# Patient Record
Sex: FEMALE | Race: WHITE | NOT HISPANIC OR LATINO | URBAN - METROPOLITAN AREA
[De-identification: names, ages, dates, MRNs, and addresses within clinical notes are randomized per-mention and may not be internally consistent; named-entity substitution may affect disease eponyms.]

---

## 2018-08-01 ENCOUNTER — CONSULTATION (OUTPATIENT)
Dept: URBAN - METROPOLITAN AREA CLINIC 87 | Facility: CLINIC | Age: 63
End: 2018-08-01

## 2018-08-01 DIAGNOSIS — H33.311: ICD-10-CM

## 2018-08-01 DIAGNOSIS — H33.321: ICD-10-CM

## 2018-08-01 DIAGNOSIS — H35.61: ICD-10-CM

## 2018-08-01 PROCEDURE — 92225 OPHTHALMOSCOPY (INITIAL): CPT

## 2018-08-01 PROCEDURE — 1036F TOBACCO NON-USER: CPT

## 2018-08-01 PROCEDURE — G8427 DOCREV CUR MEDS BY ELIG CLIN: HCPCS

## 2018-08-01 PROCEDURE — 4040F PNEUMOC VAC/ADMIN/RCVD: CPT | Mod: 8P

## 2018-08-01 PROCEDURE — 67145 PROPH RTA DTCHMNT PC: CPT

## 2018-08-01 PROCEDURE — 99204 OFFICE O/P NEW MOD 45 MIN: CPT | Mod: 57

## 2018-08-01 ASSESSMENT — TONOMETRY
OD_IOP_MMHG: 19
OS_IOP_MMHG: 18

## 2018-08-01 ASSESSMENT — VISUAL ACUITY
OD_SC: 20/40+2
OS_SC: 20/50
OS_SC: 20/40-2
OS_PH: 20/25-2
OD_SC: 20/200

## 2018-08-13 ENCOUNTER — POST-OP CHECK (OUTPATIENT)
Dept: URBAN - METROPOLITAN AREA CLINIC 52 | Facility: CLINIC | Age: 63
End: 2018-08-13

## 2018-08-13 DIAGNOSIS — H33.321: ICD-10-CM

## 2018-08-13 DIAGNOSIS — H33.311: ICD-10-CM

## 2018-08-13 PROCEDURE — 92226 OPHTHALMOSCOPY (SUB): CPT

## 2018-08-13 PROCEDURE — 99024 POSTOP FOLLOW-UP VISIT: CPT

## 2018-08-13 ASSESSMENT — VISUAL ACUITY
OS_SC: 20/25-2
OD_SC: 20/30-2

## 2018-08-13 ASSESSMENT — TONOMETRY
OS_IOP_MMHG: 16
OD_IOP_MMHG: 20

## 2018-09-17 ENCOUNTER — POST-OP CHECK (OUTPATIENT)
Dept: URBAN - METROPOLITAN AREA CLINIC 52 | Facility: CLINIC | Age: 63
End: 2018-09-17

## 2018-09-17 DIAGNOSIS — H33.321: ICD-10-CM

## 2018-09-17 DIAGNOSIS — H33.311: ICD-10-CM

## 2018-09-17 PROCEDURE — 99024 POSTOP FOLLOW-UP VISIT: CPT

## 2018-09-17 PROCEDURE — 92226 OPHTHALMOSCOPY (SUB): CPT

## 2018-09-17 ASSESSMENT — VISUAL ACUITY
OS_SC: 20/30-1
OD_SC: 20/40

## 2018-09-17 ASSESSMENT — TONOMETRY
OD_IOP_MMHG: 20
OS_IOP_MMHG: 14

## 2021-11-29 ENCOUNTER — HOSPITAL ENCOUNTER (EMERGENCY)
Facility: HOSPITAL | Age: 66
Discharge: HOME | End: 2021-11-29
Attending: EMERGENCY MEDICINE | Admitting: EMERGENCY MEDICINE
Payer: COMMERCIAL

## 2021-11-29 ENCOUNTER — APPOINTMENT (EMERGENCY)
Dept: RADIOLOGY | Facility: HOSPITAL | Age: 66
End: 2021-11-29
Payer: COMMERCIAL

## 2021-11-29 VITALS
BODY MASS INDEX: 44.41 KG/M2 | HEART RATE: 68 BPM | WEIGHT: 293 LBS | DIASTOLIC BLOOD PRESSURE: 88 MMHG | RESPIRATION RATE: 20 BRPM | HEIGHT: 68 IN | SYSTOLIC BLOOD PRESSURE: 180 MMHG | TEMPERATURE: 98.4 F | OXYGEN SATURATION: 96 %

## 2021-11-29 DIAGNOSIS — R07.89 ATYPICAL CHEST PAIN: ICD-10-CM

## 2021-11-29 DIAGNOSIS — R06.02 SOB (SHORTNESS OF BREATH): Primary | ICD-10-CM

## 2021-11-29 DIAGNOSIS — M79.10 MYALGIA: ICD-10-CM

## 2021-11-29 DIAGNOSIS — R05.9 COUGH: ICD-10-CM

## 2021-11-29 LAB
ANION GAP SERPL CALCULATED.3IONS-SCNC: 5 MMOL/L (ref 4–13)
ANION GAP SERPL CALCULATED.3IONS-SCNC: 9 MMOL/L (ref 4–13)
BASOPHILS # BLD AUTO: 0.06 THOUSANDS/ΜL (ref 0–0.1)
BASOPHILS # BLD AUTO: 0.07 THOUSANDS/ΜL (ref 0–0.1)
BASOPHILS NFR BLD AUTO: 1 % (ref 0–1)
BASOPHILS NFR BLD AUTO: 1 % (ref 0–1)
BUN SERPL-MCNC: 15 MG/DL (ref 5–25)
BUN SERPL-MCNC: 16 MG/DL (ref 5–25)
CALCIUM SERPL-MCNC: 8.8 MG/DL (ref 8.3–10.1)
CALCIUM SERPL-MCNC: 9 MG/DL (ref 8.3–10.1)
CARDIAC TROPONIN I PNL SERPL HS: 2 NG/L
CARDIAC TROPONIN I PNL SERPL HS: 3 NG/L
CHLORIDE SERPL-SCNC: 105 MMOL/L (ref 100–108)
CHLORIDE SERPL-SCNC: 106 MMOL/L (ref 100–108)
CO2 SERPL-SCNC: 27 MMOL/L (ref 21–32)
CO2 SERPL-SCNC: 29 MMOL/L (ref 21–32)
CREAT SERPL-MCNC: 0.75 MG/DL (ref 0.6–1.3)
CREAT SERPL-MCNC: 0.81 MG/DL (ref 0.6–1.3)
EOSINOPHIL # BLD AUTO: 0.4 THOUSAND/ΜL (ref 0–0.61)
EOSINOPHIL # BLD AUTO: 0.43 THOUSAND/ΜL (ref 0–0.61)
EOSINOPHIL NFR BLD AUTO: 8 % (ref 0–6)
EOSINOPHIL NFR BLD AUTO: 8 % (ref 0–6)
ERYTHROCYTE [DISTWIDTH] IN BLOOD BY AUTOMATED COUNT: 11.5 % (ref 11.6–15.1)
ERYTHROCYTE [DISTWIDTH] IN BLOOD BY AUTOMATED COUNT: 11.7 % (ref 11.6–15.1)
FLUAV RNA RESP QL NAA+PROBE: NEGATIVE
FLUAV RNA RESP QL NAA+PROBE: NEGATIVE
FLUBV RNA RESP QL NAA+PROBE: NEGATIVE
FLUBV RNA RESP QL NAA+PROBE: NEGATIVE
GFR SERPL CREATININE-BSD FRML MDRD: 77 ML/MIN/1.73SQ M
GFR SERPL CREATININE-BSD FRML MDRD: 85 ML/MIN/1.73SQ M
GLUCOSE SERPL-MCNC: 124 MG/DL (ref 65–140)
GLUCOSE SERPL-MCNC: 135 MG/DL (ref 65–140)
HCT VFR BLD AUTO: 39.8 % (ref 34.8–46.1)
HCT VFR BLD AUTO: 40.9 % (ref 34.8–46.1)
HGB BLD-MCNC: 13 G/DL (ref 11.5–15.4)
HGB BLD-MCNC: 13.2 G/DL (ref 11.5–15.4)
IMM GRANULOCYTES # BLD AUTO: 0.01 THOUSAND/UL (ref 0–0.2)
IMM GRANULOCYTES # BLD AUTO: 0.03 THOUSAND/UL (ref 0–0.2)
IMM GRANULOCYTES NFR BLD AUTO: 0 % (ref 0–2)
IMM GRANULOCYTES NFR BLD AUTO: 1 % (ref 0–2)
LYMPHOCYTES # BLD AUTO: 2.16 THOUSANDS/ΜL (ref 0.6–4.47)
LYMPHOCYTES # BLD AUTO: 2.21 THOUSANDS/ΜL (ref 0.6–4.47)
LYMPHOCYTES NFR BLD AUTO: 42 % (ref 14–44)
LYMPHOCYTES NFR BLD AUTO: 43 % (ref 14–44)
MCH RBC QN AUTO: 31.3 PG (ref 26.8–34.3)
MCH RBC QN AUTO: 31.3 PG (ref 26.8–34.3)
MCHC RBC AUTO-ENTMCNC: 32.3 G/DL (ref 31.4–37.4)
MCHC RBC AUTO-ENTMCNC: 32.7 G/DL (ref 31.4–37.4)
MCV RBC AUTO: 96 FL (ref 82–98)
MCV RBC AUTO: 97 FL (ref 82–98)
MONOCYTES # BLD AUTO: 0.5 THOUSAND/ΜL (ref 0.17–1.22)
MONOCYTES # BLD AUTO: 0.56 THOUSAND/ΜL (ref 0.17–1.22)
MONOCYTES NFR BLD AUTO: 10 % (ref 4–12)
MONOCYTES NFR BLD AUTO: 11 % (ref 4–12)
NEUTROPHILS # BLD AUTO: 1.92 THOUSANDS/ΜL (ref 1.85–7.62)
NEUTROPHILS # BLD AUTO: 1.95 THOUSANDS/ΜL (ref 1.85–7.62)
NEUTS SEG NFR BLD AUTO: 37 % (ref 43–75)
NEUTS SEG NFR BLD AUTO: 38 % (ref 43–75)
NRBC BLD AUTO-RTO: 0 /100 WBCS
NRBC BLD AUTO-RTO: 0 /100 WBCS
NT-PROBNP SERPL-MCNC: 52 PG/ML
PLATELET # BLD AUTO: 237 THOUSANDS/UL (ref 149–390)
PLATELET # BLD AUTO: 249 THOUSANDS/UL (ref 149–390)
PMV BLD AUTO: 10.1 FL (ref 8.9–12.7)
PMV BLD AUTO: 9.1 FL (ref 8.9–12.7)
POTASSIUM SERPL-SCNC: 4.1 MMOL/L (ref 3.5–5.3)
POTASSIUM SERPL-SCNC: 4.3 MMOL/L (ref 3.5–5.3)
RBC # BLD AUTO: 4.15 MILLION/UL (ref 3.81–5.12)
RBC # BLD AUTO: 4.22 MILLION/UL (ref 3.81–5.12)
RSV RNA RESP QL NAA+PROBE: NEGATIVE
RSV RNA RESP QL NAA+PROBE: NEGATIVE
SARS-COV-2 RNA RESP QL NAA+PROBE: NEGATIVE
SARS-COV-2 RNA RESP QL NAA+PROBE: NEGATIVE
SODIUM SERPL-SCNC: 140 MMOL/L (ref 136–145)
SODIUM SERPL-SCNC: 141 MMOL/L (ref 136–145)
WBC # BLD AUTO: 5.09 THOUSAND/UL (ref 4.31–10.16)
WBC # BLD AUTO: 5.21 THOUSAND/UL (ref 4.31–10.16)

## 2021-11-29 PROCEDURE — 99285 EMERGENCY DEPT VISIT HI MDM: CPT

## 2021-11-29 PROCEDURE — 0241U HB NFCT DS VIR RESP RNA 4 TRGT: CPT | Performed by: EMERGENCY MEDICINE

## 2021-11-29 PROCEDURE — 84484 ASSAY OF TROPONIN QUANT: CPT | Performed by: EMERGENCY MEDICINE

## 2021-11-29 PROCEDURE — 85025 COMPLETE CBC W/AUTO DIFF WBC: CPT | Performed by: EMERGENCY MEDICINE

## 2021-11-29 PROCEDURE — 71045 X-RAY EXAM CHEST 1 VIEW: CPT

## 2021-11-29 PROCEDURE — 83880 ASSAY OF NATRIURETIC PEPTIDE: CPT | Performed by: EMERGENCY MEDICINE

## 2021-11-29 PROCEDURE — 93005 ELECTROCARDIOGRAM TRACING: CPT

## 2021-11-29 PROCEDURE — 99285 EMERGENCY DEPT VISIT HI MDM: CPT | Performed by: EMERGENCY MEDICINE

## 2021-11-29 PROCEDURE — 36415 COLL VENOUS BLD VENIPUNCTURE: CPT | Performed by: EMERGENCY MEDICINE

## 2021-11-29 PROCEDURE — 80048 BASIC METABOLIC PNL TOTAL CA: CPT | Performed by: EMERGENCY MEDICINE

## 2021-11-29 RX ORDER — GUAIFENESIN/DEXTROMETHORPHAN 100-10MG/5
10 SYRUP ORAL ONCE
Status: DISCONTINUED | OUTPATIENT
Start: 2021-11-29 | End: 2021-11-29

## 2021-11-29 RX ORDER — FUROSEMIDE 10 MG/ML
40 INJECTION INTRAMUSCULAR; INTRAVENOUS ONCE
Status: DISCONTINUED | OUTPATIENT
Start: 2021-11-29 | End: 2021-11-29 | Stop reason: HOSPADM

## 2021-11-29 RX ORDER — LISINOPRIL 10 MG/1
10 TABLET ORAL DAILY
COMMUNITY

## 2021-11-29 RX ORDER — GUAIFENESIN 100 MG/5ML
200 SYRUP ORAL 3 TIMES DAILY PRN
Qty: 120 ML | Refills: 0 | Status: SHIPPED | OUTPATIENT
Start: 2021-11-29 | End: 2021-12-09

## 2021-11-29 RX ORDER — ASPIRIN 81 MG/1
81 TABLET, CHEWABLE ORAL DAILY
COMMUNITY

## 2021-12-04 LAB
ATRIAL RATE: 71 BPM
P AXIS: 32 DEGREES
PR INTERVAL: 144 MS
QRS AXIS: 1 DEGREES
QRSD INTERVAL: 88 MS
QT INTERVAL: 380 MS
QTC INTERVAL: 412 MS
T WAVE AXIS: 27 DEGREES
VENTRICULAR RATE: 71 BPM

## 2021-12-04 PROCEDURE — 93010 ELECTROCARDIOGRAM REPORT: CPT | Performed by: INTERNAL MEDICINE

## 2023-09-03 ENCOUNTER — APPOINTMENT (EMERGENCY)
Dept: RADIOLOGY | Facility: HOSPITAL | Age: 68
End: 2023-09-03
Payer: COMMERCIAL

## 2023-09-03 ENCOUNTER — HOSPITAL ENCOUNTER (EMERGENCY)
Facility: HOSPITAL | Age: 68
Discharge: HOME | End: 2023-09-03
Attending: EMERGENCY MEDICINE | Admitting: EMERGENCY MEDICINE
Payer: COMMERCIAL

## 2023-09-03 VITALS
BODY MASS INDEX: 47.33 KG/M2 | HEART RATE: 96 BPM | TEMPERATURE: 99.6 F | OXYGEN SATURATION: 97 % | WEIGHT: 293 LBS | DIASTOLIC BLOOD PRESSURE: 96 MMHG | RESPIRATION RATE: 20 BRPM | SYSTOLIC BLOOD PRESSURE: 147 MMHG

## 2023-09-03 DIAGNOSIS — K59.00 CONSTIPATION: Primary | ICD-10-CM

## 2023-09-03 LAB
ALBUMIN SERPL BCP-MCNC: 4.4 G/DL (ref 3.5–5)
ALP SERPL-CCNC: 46 U/L (ref 34–104)
ALT SERPL W P-5'-P-CCNC: 13 U/L (ref 7–52)
ANION GAP SERPL CALCULATED.3IONS-SCNC: 7 MMOL/L
AST SERPL W P-5'-P-CCNC: 14 U/L (ref 13–39)
BASOPHILS # BLD AUTO: 0.05 THOUSANDS/ÂΜL (ref 0–0.1)
BASOPHILS NFR BLD AUTO: 1 % (ref 0–1)
BILIRUB SERPL-MCNC: 0.68 MG/DL (ref 0.2–1)
BUN SERPL-MCNC: 10 MG/DL (ref 5–25)
CALCIUM SERPL-MCNC: 9.6 MG/DL (ref 8.4–10.2)
CHLORIDE SERPL-SCNC: 105 MMOL/L (ref 96–108)
CO2 SERPL-SCNC: 26 MMOL/L (ref 21–32)
CREAT SERPL-MCNC: 0.71 MG/DL (ref 0.6–1.3)
EOSINOPHIL # BLD AUTO: 0.2 THOUSAND/ÂΜL (ref 0–0.61)
EOSINOPHIL NFR BLD AUTO: 3 % (ref 0–6)
ERYTHROCYTE [DISTWIDTH] IN BLOOD BY AUTOMATED COUNT: 12.1 % (ref 11.6–15.1)
GFR SERPL CREATININE-BSD FRML MDRD: 89 ML/MIN/1.73SQ M
GLUCOSE SERPL-MCNC: 116 MG/DL (ref 65–140)
HCT VFR BLD AUTO: 41.9 % (ref 34.8–46.1)
HGB BLD-MCNC: 14.5 G/DL (ref 11.5–15.4)
IMM GRANULOCYTES # BLD AUTO: 0.03 THOUSAND/UL (ref 0–0.2)
IMM GRANULOCYTES NFR BLD AUTO: 1 % (ref 0–2)
LYMPHOCYTES # BLD AUTO: 1.91 THOUSANDS/ÂΜL (ref 0.6–4.47)
LYMPHOCYTES NFR BLD AUTO: 31 % (ref 14–44)
MCH RBC QN AUTO: 32.4 PG (ref 26.8–34.3)
MCHC RBC AUTO-ENTMCNC: 34.6 G/DL (ref 31.4–37.4)
MCV RBC AUTO: 94 FL (ref 82–98)
MONOCYTES # BLD AUTO: 0.62 THOUSAND/ÂΜL (ref 0.17–1.22)
MONOCYTES NFR BLD AUTO: 10 % (ref 4–12)
NEUTROPHILS # BLD AUTO: 3.28 THOUSANDS/ÂΜL (ref 1.85–7.62)
NEUTS SEG NFR BLD AUTO: 54 % (ref 43–75)
NRBC BLD AUTO-RTO: 0 /100 WBCS
PLATELET # BLD AUTO: 261 THOUSANDS/UL (ref 149–390)
PMV BLD AUTO: 9 FL (ref 8.9–12.7)
POTASSIUM SERPL-SCNC: 3.7 MMOL/L (ref 3.5–5.3)
PROT SERPL-MCNC: 7.2 G/DL (ref 6.4–8.4)
RBC # BLD AUTO: 4.47 MILLION/UL (ref 3.81–5.12)
SODIUM SERPL-SCNC: 138 MMOL/L (ref 135–147)
WBC # BLD AUTO: 6.09 THOUSAND/UL (ref 4.31–10.16)

## 2023-09-03 PROCEDURE — 80053 COMPREHEN METABOLIC PANEL: CPT | Performed by: EMERGENCY MEDICINE

## 2023-09-03 PROCEDURE — 96361 HYDRATE IV INFUSION ADD-ON: CPT

## 2023-09-03 PROCEDURE — 99284 EMERGENCY DEPT VISIT MOD MDM: CPT | Performed by: EMERGENCY MEDICINE

## 2023-09-03 PROCEDURE — 36415 COLL VENOUS BLD VENIPUNCTURE: CPT | Performed by: EMERGENCY MEDICINE

## 2023-09-03 PROCEDURE — 99283 EMERGENCY DEPT VISIT LOW MDM: CPT

## 2023-09-03 PROCEDURE — 74022 RADEX COMPL AQT ABD SERIES: CPT

## 2023-09-03 PROCEDURE — 85025 COMPLETE CBC W/AUTO DIFF WBC: CPT | Performed by: EMERGENCY MEDICINE

## 2023-09-03 PROCEDURE — 96360 HYDRATION IV INFUSION INIT: CPT

## 2023-09-03 RX ORDER — ALBUTEROL SULFATE 90 UG/1
2 AEROSOL, METERED RESPIRATORY (INHALATION) EVERY 6 HOURS PRN
COMMUNITY

## 2023-09-03 RX ORDER — DOCUSATE SODIUM 100 MG/1
100 CAPSULE, LIQUID FILLED ORAL EVERY 12 HOURS
Qty: 60 CAPSULE | Refills: 0 | Status: SHIPPED | OUTPATIENT
Start: 2023-09-03

## 2023-09-03 RX ORDER — POLYETHYLENE GLYCOL 3350 17 G/17G
17 POWDER, FOR SOLUTION ORAL DAILY
Qty: 119 G | Refills: 0 | Status: SHIPPED | OUTPATIENT
Start: 2023-09-03 | End: 2023-09-10

## 2023-09-03 RX ADMIN — SODIUM CHLORIDE 1000 ML: 0.9 INJECTION, SOLUTION INTRAVENOUS at 15:44

## 2023-09-03 NOTE — ED NOTES
This RN administer soap enema to pt. Pt instructed to use call bell when she is ready to use bedside commode.       Candace Lobo RN  09/03/23 0307

## 2023-09-03 NOTE — ED PROVIDER NOTES
History  Chief Complaint   Patient presents with   • Constipation     C/o constipation. No bm for three days. Took dulcolax suppository which came out. States cheese constipates her and this is what she has been eating for past several days     62-year-old female with past history of morbid obesity, constipation every time she eats cheese, arthritis, hypertension, mitral valve prolapse, presents to the ED for evaluation of constipation with no stool output over the past 3 days. Patient states that she happened to have only cheesy products to eat over the past week. Patient usually eats prunes or spinach when she eats cheese however she forgot. Patient also drank very little water. As a result patient became constipated. Patient has not had any bowel movement over the past 3 days and feels very bloated. Patient tried to strain to have a bowel movement last night with no relief. Patient called PCP today and was told to take Dulcolax however no bowel movement after today and Dulcolax. Subsequently patient came to the ED for further evaluation. History provided by:  Patient  Constipation  Associated symptoms: no abdominal pain, no back pain, no dysuria, no fever and no vomiting        Prior to Admission Medications   Prescriptions Last Dose Informant Patient Reported? Taking? albuterol (PROVENTIL HFA,VENTOLIN HFA) 90 mcg/act inhaler   Yes Yes   Sig: Inhale 2 puffs every 6 (six) hours as needed for wheezing   aspirin 81 mg chewable tablet   Yes No   Sig: Chew 81 mg daily   lisinopril (ZESTRIL) 10 mg tablet   Yes No   Sig: Take 10 mg by mouth daily   metoprolol tartrate (LOPRESSOR) 25 mg tablet   Yes No   Sig: Take 25 mg by mouth every 12 (twelve) hours      Facility-Administered Medications: None       Past Medical History:   Diagnosis Date   • Arthritis    • Hypertension    • Mitral prolapse        Past Surgical History:   Procedure Laterality Date   • HERNIA REPAIR         History reviewed.  No pertinent family history. I have reviewed and agree with the history as documented. E-Cigarette/Vaping   • E-Cigarette Use Never User      E-Cigarette/Vaping Substances     Social History     Tobacco Use   • Smoking status: Never   • Smokeless tobacco: Never   Vaping Use   • Vaping Use: Never used   Substance Use Topics   • Alcohol use: Never   • Drug use: Never       Review of Systems   Constitutional: Negative for chills and fever. HENT: Negative for ear pain and sore throat. Eyes: Negative for pain and visual disturbance. Respiratory: Negative for cough and shortness of breath. Cardiovascular: Negative for chest pain and palpitations. Gastrointestinal: Positive for constipation. Negative for abdominal pain and vomiting. Genitourinary: Negative for dysuria and hematuria. Musculoskeletal: Negative for arthralgias and back pain. Skin: Negative for color change and rash. Neurological: Negative for seizures and syncope. All other systems reviewed and are negative. Physical Exam  Physical Exam  Vitals and nursing note reviewed. Constitutional:       General: She is not in acute distress. Appearance: She is well-developed. Comments: Morbidly obese female   HENT:      Head: Normocephalic and atraumatic. Eyes:      Conjunctiva/sclera: Conjunctivae normal.   Cardiovascular:      Rate and Rhythm: Normal rate and regular rhythm. Heart sounds: No murmur heard. Pulmonary:      Effort: Pulmonary effort is normal. No respiratory distress. Breath sounds: Normal breath sounds. Abdominal:      General: Bowel sounds are normal. There is distension. Palpations: Abdomen is soft. Tenderness: There is abdominal tenderness. Comments: Abdomen is soft, mildly distended, with some pressure felt to the suprapubic area. Musculoskeletal:         General: No swelling. Cervical back: Neck supple. Skin:     General: Skin is warm and dry.       Capillary Refill: Capillary refill takes less than 2 seconds. Neurological:      Mental Status: She is alert.    Psychiatric:         Mood and Affect: Mood normal.         Vital Signs  ED Triage Vitals [09/03/23 1421]   Temperature Pulse Respirations Blood Pressure SpO2   99.6 °F (37.6 °C) 84 22 (!) 172/94 97 %      Temp Source Heart Rate Source Patient Position - Orthostatic VS BP Location FiO2 (%)   Tympanic Monitor Sitting Right arm --      Pain Score       10 - Worst Possible Pain           Vitals:    09/03/23 1421 09/03/23 1932   BP: (!) 172/94 147/96   Pulse: 84 96   Patient Position - Orthostatic VS: Sitting Sitting         Visual Acuity      ED Medications  Medications   sodium chloride 0.9 % bolus 1,000 mL (0 mL Intravenous Stopped 9/3/23 1930)       Diagnostic Studies  Results Reviewed     Procedure Component Value Units Date/Time    Comprehensive metabolic panel [[id number]] Collected: 09/03/23 1543    Lab Status: Final result Specimen: Blood from Arm, Right Updated: 09/03/23 1607     Sodium 138 mmol/L      Potassium 3.7 mmol/L      Chloride 105 mmol/L      CO2 26 mmol/L      ANION GAP 7 mmol/L      BUN 10 mg/dL      Creatinine 0.71 mg/dL      Glucose 116 mg/dL      Calcium 9.6 mg/dL      AST 14 U/L      ALT 13 U/L      Alkaline Phosphatase 46 U/L      Total Protein 7.2 g/dL      Albumin 4.4 g/dL      Total Bilirubin 0.68 mg/dL      eGFR 89 ml/min/1.73sq m     Narrative:      [facility] guidelines for Chronic Kidney Disease (CKD):   •  Stage 1 with normal or high GFR (GFR > 90 mL/min/1.73 square meters)  •  Stage 2 Mild CKD (GFR = 60-89 mL/min/1.73 square meters)  •  Stage 3A Moderate CKD (GFR = 45-59 mL/min/1.73 square meters)  •  Stage 3B Moderate CKD (GFR = 30-44 mL/min/1.73 square meters)  •  Stage 4 Severe CKD (GFR = 15-29 mL/min/1.73 square meters)  •  Stage 5 End Stage CKD (GFR <15 mL/min/1.73 square meters)  Note: GFR calculation is accurate only with a steady state creatinine    CBC and differential [[id number]] Collected: 09/03/23 1543    Lab Status: Final result Specimen: Blood from Arm, Right Updated: 09/03/23 1550     WBC 6.09 Thousand/uL      RBC 4.47 Million/uL      Hemoglobin 14.5 g/dL      Hematocrit 41.9 %      MCV 94 fL      MCH 32.4 pg      MCHC 34.6 g/dL      RDW 12.1 %      MPV 9.0 fL      Platelets 326 Thousands/uL      nRBC 0 /100 WBCs      Neutrophils Relative 54 %      Immat GRANS % 1 %      Lymphocytes Relative 31 %      Monocytes Relative 10 %      Eosinophils Relative 3 %      Basophils Relative 1 %      Neutrophils Absolute 3.28 Thousands/µL      Immature Grans Absolute 0.03 Thousand/uL      Lymphocytes Absolute 1.91 Thousands/µL      Monocytes Absolute 0.62 Thousand/µL      Eosinophils Absolute 0.20 Thousand/µL      Basophils Absolute 0.05 Thousands/µL                  XR abdomen obstruction series    (Results Pending)              Procedures  Procedures         ED Course  ED Course as of 09/03/23 2313   Sun Sep 03, 2023   1718 Bladder scan 18 cc of urine. 2019 Patient reexamined at bedside. Patient had a bowel movement and she feels much better. Patient is requesting to go home. SBIRT 22yo+    Flowsheet Row Most Recent Value   Initial Alcohol Screen: US AUDIT-C     1. How often do you have a drink containing alcohol? 0 Filed at: 09/03/2023 1935   2. How many drinks containing alcohol do you have on a typical day you are drinking? 0 Filed at: 09/03/2023 1935   3b. FEMALE Any Age, or MALE 65+: How often do you have 4 or more drinks on one occassion? 0 Filed at: 09/03/2023 1935   Audit-C Score 0 Filed at: 09/03/2023 1935   DEANDRE: How many times in the past year have you. .. Used an illegal drug or used a prescription medication for non-medical reasons?  Never Filed at: 09/03/2023 1935                    Medical Decision Making  Obtain blood work, UA, obstruction series  Give enema and continue to monitor patient for any worsening symptoms    Patient's abdominal x-ray showed moderate amount of stool in the rectal vault based on my impression. Formal radiology reading is pending. Patient had a large bowel movement after soapsuds enema were given. At this time I discussed high-fiber diet. Patient is discharged home on Colace as well as MiraLAX and follow-up to PCP. Close return instructions given to return to the ER for any worsening symptoms. Patient agrees with discharge plan. Patient well appearing at time of discharge. Please Note: Fluency Direct voice recognition software may have been used in the creation of this document. Wrong words or sound a like substitutions may have occurred due to the inherent limitations of the voice software. Amount and/or Complexity of Data Reviewed  Labs: ordered. Radiology: ordered and independent interpretation performed. Decision-making details documented in ED Course. Risk  OTC drugs. Disposition  Final diagnoses:   Constipation     Time reflects when diagnosis was documented in both MDM as applicable and the Disposition within this note     Time User Action Codes Description Comment    9/3/2023  8:27 PM Daina Hernandez Add [K59.00] Constipation       ED Disposition     ED Disposition   Discharge    Condition   Stable    Date/Time   Sun Sep 3, 2023  8:27 PM    Comment   Hoang Jacobson discharge to home/self care.                Follow-up Information     Follow up With Specialties Details Why [address], MD Internal Medicine In 1 week  [address] [place] [address]  [phone]            Discharge Medication List as of 9/3/2023  8:28 PM      START taking these medications    Details   docusate sodium (COLACE) 100 mg capsule Take 1 capsule (100 mg total) by mouth every 12 (twelve) hours, Starting Sun 9/3/2023, Normal      polyethylene glycol (MIRALAX) 17 g packet Take 17 g by mouth daily for 7 days, Starting Sun 9/3/2023, Until Sun 9/10/2023, Normal CONTINUE these medications which have NOT CHANGED    Details   albuterol (PROVENTIL HFA,VENTOLIN HFA) 90 mcg/act inhaler Inhale 2 puffs every 6 (six) hours as needed for wheezing, Historical Med      aspirin 81 mg chewable tablet Chew 81 mg daily, Historical Med      lisinopril (ZESTRIL) 10 mg tablet Take 10 mg by mouth daily, Historical Med      metoprolol tartrate (LOPRESSOR) 25 mg tablet Take 25 mg by mouth every 12 (twelve) hours, Historical Med             No discharge procedures on file.     PDMP Review     None          ED Provider  Electronically Signed by           Jefry Carreno DO  09/03/23 0918

## 2023-09-04 NOTE — ED NOTES
Patient able to move bowels- large brown soft and watery stool.      Butch Hernández RN  09/03/23 2018

## 2025-04-07 ENCOUNTER — HOSPITAL ENCOUNTER (OUTPATIENT)
Dept: RADIOLOGY | Facility: HOSPITAL | Age: 70
Discharge: HOME | End: 2025-04-07
Payer: MEDICARE

## 2025-04-07 DIAGNOSIS — R06.02 SHORTNESS OF BREATH: ICD-10-CM

## 2025-04-07 PROCEDURE — 71046 X-RAY EXAM CHEST 2 VIEWS: CPT

## 2025-05-29 ENCOUNTER — HOSPITAL ENCOUNTER (EMERGENCY)
Facility: HOSPITAL | Age: 70
Discharge: HOME | End: 2025-05-29
Payer: MEDICARE

## 2025-05-29 VITALS
HEART RATE: 75 BPM | TEMPERATURE: 98.6 F | RESPIRATION RATE: 17 BRPM | SYSTOLIC BLOOD PRESSURE: 177 MMHG | OXYGEN SATURATION: 97 % | DIASTOLIC BLOOD PRESSURE: 84 MMHG

## 2025-05-29 DIAGNOSIS — N39.0 UTI (URINARY TRACT INFECTION): ICD-10-CM

## 2025-05-29 DIAGNOSIS — F33.1 MODERATE EPISODE OF RECURRENT MAJOR DEPRESSIVE DISORDER (HCC): ICD-10-CM

## 2025-05-29 DIAGNOSIS — F41.9 ANXIETY: Primary | ICD-10-CM

## 2025-05-29 DIAGNOSIS — F32.A DEPRESSION: ICD-10-CM

## 2025-05-29 LAB
ALBUMIN SERPL BCG-MCNC: 4.1 G/DL (ref 3.5–5)
ALP SERPL-CCNC: 41 U/L (ref 34–104)
ALT SERPL W P-5'-P-CCNC: 16 U/L (ref 7–52)
ANION GAP SERPL CALCULATED.3IONS-SCNC: 9 MMOL/L (ref 4–13)
AST SERPL W P-5'-P-CCNC: 15 U/L (ref 13–39)
BACTERIA UR QL AUTO: ABNORMAL /HPF
BASOPHILS # BLD AUTO: 0.06 THOUSANDS/ÂΜL (ref 0–0.1)
BASOPHILS NFR BLD AUTO: 1 % (ref 0–1)
BILIRUB SERPL-MCNC: 0.72 MG/DL (ref 0.2–1)
BILIRUB UR QL STRIP: NEGATIVE
BUN SERPL-MCNC: 6 MG/DL (ref 5–25)
CALCIUM SERPL-MCNC: 9.4 MG/DL (ref 8.4–10.2)
CARDIAC TROPONIN I PNL SERPL HS: 4 NG/L (ref ?–50)
CHLORIDE SERPL-SCNC: 102 MMOL/L (ref 96–108)
CLARITY UR: CLEAR
CO2 SERPL-SCNC: 28 MMOL/L (ref 21–32)
COLOR UR: YELLOW
CREAT SERPL-MCNC: 0.71 MG/DL (ref 0.6–1.3)
EOSINOPHIL # BLD AUTO: 0.07 THOUSAND/ÂΜL (ref 0–0.61)
EOSINOPHIL NFR BLD AUTO: 1 % (ref 0–6)
ERYTHROCYTE [DISTWIDTH] IN BLOOD BY AUTOMATED COUNT: 12.2 % (ref 11.6–15.1)
GFR SERPL CREATININE-BSD FRML MDRD: 87 ML/MIN/1.73SQ M
GLUCOSE SERPL-MCNC: 110 MG/DL (ref 65–140)
GLUCOSE UR STRIP-MCNC: NEGATIVE MG/DL
HCT VFR BLD AUTO: 42 % (ref 34.8–46.1)
HGB BLD-MCNC: 14.2 G/DL (ref 11.5–15.4)
HGB UR QL STRIP.AUTO: NEGATIVE
IMM GRANULOCYTES # BLD AUTO: 0.02 THOUSAND/UL (ref 0–0.2)
IMM GRANULOCYTES NFR BLD AUTO: 0 % (ref 0–2)
KETONES UR STRIP-MCNC: ABNORMAL MG/DL
LEUKOCYTE ESTERASE UR QL STRIP: ABNORMAL
LYMPHOCYTES # BLD AUTO: 1.77 THOUSANDS/ÂΜL (ref 0.6–4.47)
LYMPHOCYTES NFR BLD AUTO: 35 % (ref 14–44)
MCH RBC QN AUTO: 31.8 PG (ref 26.8–34.3)
MCHC RBC AUTO-ENTMCNC: 33.8 G/DL (ref 31.4–37.4)
MCV RBC AUTO: 94 FL (ref 82–98)
MONOCYTES # BLD AUTO: 0.61 THOUSAND/ÂΜL (ref 0.17–1.22)
MONOCYTES NFR BLD AUTO: 12 % (ref 4–12)
MUCOUS THREADS UR QL AUTO: ABNORMAL
NEUTROPHILS # BLD AUTO: 2.52 THOUSANDS/ÂΜL (ref 1.85–7.62)
NEUTS SEG NFR BLD AUTO: 51 % (ref 43–75)
NITRITE UR QL STRIP: NEGATIVE
NON-SQ EPI CELLS URNS QL MICRO: ABNORMAL /HPF
NRBC BLD AUTO-RTO: 0 /100 WBCS
PH UR STRIP.AUTO: 6 [PH]
PLATELET # BLD AUTO: 247 THOUSANDS/UL (ref 149–390)
PMV BLD AUTO: 9.1 FL (ref 8.9–12.7)
POTASSIUM SERPL-SCNC: 3.2 MMOL/L (ref 3.5–5.3)
PROT SERPL-MCNC: 6.6 G/DL (ref 6.4–8.4)
PROT UR STRIP-MCNC: NEGATIVE MG/DL
RBC # BLD AUTO: 4.46 MILLION/UL (ref 3.81–5.12)
RBC #/AREA URNS AUTO: ABNORMAL /HPF
SODIUM SERPL-SCNC: 139 MMOL/L (ref 135–147)
SP GR UR STRIP.AUTO: 1.01 (ref 1–1.03)
TSH SERPL DL<=0.05 MIU/L-ACNC: 1.03 UIU/ML (ref 0.45–4.5)
UROBILINOGEN UR STRIP-ACNC: <2 MG/DL
WBC # BLD AUTO: 5.05 THOUSAND/UL (ref 4.31–10.16)
WBC #/AREA URNS AUTO: ABNORMAL /HPF

## 2025-05-29 PROCEDURE — 93005 ELECTROCARDIOGRAM TRACING: CPT

## 2025-05-29 PROCEDURE — 36415 COLL VENOUS BLD VENIPUNCTURE: CPT

## 2025-05-29 PROCEDURE — 84484 ASSAY OF TROPONIN QUANT: CPT

## 2025-05-29 PROCEDURE — 80053 COMPREHEN METABOLIC PANEL: CPT

## 2025-05-29 PROCEDURE — 99284 EMERGENCY DEPT VISIT MOD MDM: CPT

## 2025-05-29 PROCEDURE — 84443 ASSAY THYROID STIM HORMONE: CPT

## 2025-05-29 PROCEDURE — 85025 COMPLETE CBC W/AUTO DIFF WBC: CPT

## 2025-05-29 PROCEDURE — 81001 URINALYSIS AUTO W/SCOPE: CPT

## 2025-05-29 PROCEDURE — 87086 URINE CULTURE/COLONY COUNT: CPT

## 2025-05-29 RX ORDER — HYDROXYZINE HYDROCHLORIDE 25 MG/1
25 TABLET, FILM COATED ORAL EVERY 6 HOURS
Qty: 12 TABLET | Refills: 0 | Status: SHIPPED | OUTPATIENT
Start: 2025-05-29

## 2025-05-29 RX ORDER — CEPHALEXIN 500 MG/1
500 CAPSULE ORAL ONCE
Status: COMPLETED | OUTPATIENT
Start: 2025-05-29 | End: 2025-05-29

## 2025-05-29 RX ORDER — POTASSIUM CHLORIDE 1500 MG/1
40 TABLET, EXTENDED RELEASE ORAL ONCE
Status: COMPLETED | OUTPATIENT
Start: 2025-05-29 | End: 2025-05-29

## 2025-05-29 RX ORDER — CEPHALEXIN 500 MG/1
500 CAPSULE ORAL EVERY 12 HOURS SCHEDULED
Qty: 10 CAPSULE | Refills: 0 | Status: SHIPPED | OUTPATIENT
Start: 2025-05-29 | End: 2025-06-03

## 2025-05-29 RX ORDER — CEPHALEXIN 500 MG/1
500 CAPSULE ORAL ONCE
Status: DISCONTINUED | OUTPATIENT
Start: 2025-05-29 | End: 2025-05-29

## 2025-05-29 RX ORDER — CIPROFLOXACIN 250 MG/1
250 TABLET, FILM COATED ORAL EVERY 12 HOURS
Qty: 6 TABLET | Refills: 0 | Status: SHIPPED | OUTPATIENT
Start: 2025-05-29 | End: 2025-05-29

## 2025-05-29 RX ORDER — LORAZEPAM 1 MG/1
1 TABLET ORAL ONCE
Status: COMPLETED | OUTPATIENT
Start: 2025-05-29 | End: 2025-05-29

## 2025-05-29 RX ORDER — CIPROFLOXACIN 250 MG/1
250 TABLET, FILM COATED ORAL ONCE
Status: DISCONTINUED | OUTPATIENT
Start: 2025-05-29 | End: 2025-05-29

## 2025-05-29 RX ADMIN — POTASSIUM CHLORIDE 40 MEQ: 1500 TABLET, EXTENDED RELEASE ORAL at 21:18

## 2025-05-29 RX ADMIN — LORAZEPAM 1 MG: 1 TABLET ORAL at 21:18

## 2025-05-29 RX ADMIN — CEPHALEXIN 500 MG: 500 CAPSULE ORAL at 22:01

## 2025-05-29 NOTE — ED NOTES
69 y/o female presented to the ED. Pt report has hx of depression and feeling of anxiety over family issues, denies SI/HI and looking for resources.  Pt also reports new meds Xanax and pt feels heart racing and panic of having heart attack. PES went into speak with the patient to complete the crisis and safety assessment. Patient states she has really bad anxiety having panic attacks regularly. Patient reports being afraid of everything. Patient reports not being able to sleep at all. The patient does not have any out patient resources. The patient reports over thinking a lot and it causes her more anxiety. The patient stated she gets overwhelmed easily. The patient denies SI/HI/AVH/Paranoia. The patient is interested in some outpatient resources and a referral to [facility]

## 2025-05-30 ENCOUNTER — TELEPHONE (OUTPATIENT)
Dept: PSYCHOLOGY | Facility: CLINIC | Age: 70
End: 2025-05-30

## 2025-05-30 NOTE — ED NOTES
[facility] PART[facility] PROGRAM REFERRAL     [facility]    Name and Date of Birth:  Juanita Willard 70 y.o. 1955    Date of Referral: May 29, 2025    Referral Source (Agency/Name): [facility]     Correct Demographics on file:  Yes     Emergency contact on file: Yes     Insurance on file: Yes     _____________________________________________      Presenting Symptoms and Stressors:      Please describe the reason for Referral: patient has increased anxiety and depression     Stressors: family problems and chronic anxiety    Symptoms:  depression, anxiety, and panic attacks    Suicidal Ideation: None    Homicidal Ideation: None    Depressed Mood: depressed mood    Calli/Hypomania: None    Psychosis: None    Agitation: No    Appetite Changes: decreased appetite    Sleep Disturbance: difficulty sleeping    Access to Weapons:  No    Smoking Status: denies use    Substance Use:  None  If Use : Last use N/A   If Use: Current SA treatment: N/A    Current Psychiatrist or Therapist:    Psychiatrist: None  Therapist: None    Past Psychiatric Treatment: N/A    If currently Inpatient - Date of Anticipated discharge: N/A    Diagnoses:  Major Depressive Disorder, single, moderate  Generalized Anxiety Disorder    Do they Require Ambulatory Assistance: cane    Communication Assistance: not required     Legal Issues: None        Eloisa Ann

## 2025-05-30 NOTE — TELEPHONE ENCOUNTER
Called pt to discuss PHP referral but her daughter picked up the call and stated she will ask mom to return my call to discuss this.

## 2025-05-30 NOTE — DISCHARGE INSTRUCTIONS
Please follow up with the services as recommended by our crisis team.     You can take the melatonin and if you need an extra medication to try and help with the sleep then you can use the atarax or try unisom OTC.     Please take the keflex for the UTI.     Please return to the ED with any further concerns.

## 2025-05-30 NOTE — ED NOTES
Provider discussing urine results and treatment options with pt and daughter     Lidya Wu, RN  05/29/25 0610

## 2025-05-30 NOTE — ED NOTES
Patient states she is still unable to urinate at this time     Elizabeth Fraser, RN  05/29/25 2045

## 2025-05-30 NOTE — ED NOTES
Received pt being reevaluated by provider, having discussion about plan of care     Lidya Wu, RN  05/29/25 1828

## 2025-05-31 LAB — BACTERIA UR CULT: NORMAL

## 2025-05-31 NOTE — ED PROVIDER NOTES
Time reflects when diagnosis was documented in both MDM as applicable and the Disposition within this note       Time User Action Codes Description Comment    5/29/2025  8:16 PM Eloisa Ann Add [F41.9] Anxiety     5/29/2025  8:16 PM Eloisa Ann Add [F33.1] Moderate episode of recurrent major depressive disorder (HCC)     5/29/2025  9:45 PM Yokubaitis, Alberto Add [F32.A] Depression     5/29/2025  9:45 PM Yokubaitis, Alberto Modify [F41.9] Anxiety     5/29/2025  9:45 PM Yokubaitis, Alberto Add [N39.0] UTI (urinary tract infection)           ED Disposition       ED Disposition   Discharge    Condition   Stable    Date/Time   Thu May 29, 2025  9:45 PM    Comment   Juanita Willard discharge to home/self care.                   Assessment & Plan       Medical Decision Making  70-year-old female present emergency department due to depression and anxiety.  Workup to evaluate for potential medical problems such as cardiac abnormalities obtained and generally unremarkable with a normal sinus rhythm on EKG, no ST or T wave changes.  Negative troponin.  Normal hemoglobin and electrolytes.  Does have findings of a urinary tract infection so we will treat with antibiotics.  Discussed workup with patient.  She discussed options for further care with crisis regarding her mental health.  Ultimately decided for outpatient management and follow-up tomorrow with psych services.  Discharged with Atarax, also mentioned that she can take Unisom instead to help with her sleep difficulties.    Amount and/or Complexity of Data Reviewed  Labs: ordered. Decision-making details documented in ED Course.    Risk  Prescription drug management.        ED Course as of 05/30/25 2253   Thu May 29, 2025   2142 WBC, UA(!): 10-20       Medications   potassium chloride (Klor-Con M20) CR tablet 40 mEq (40 mEq Oral Given 5/29/25 2118)   LORazepam (ATIVAN) tablet 1 mg (1 mg Oral Given 5/29/25 2118)   cephalexin (KEFLEX) capsule 500 mg (500 mg Oral Given 5/29/25  2200)       ED Risk Strat Scores                    (ISAR) Identification of Seniors at Risk  Before the illness or injury that brought you to the Emergency, did you need someone to help you on a regular basis?: 1  In the last 24 hours, have you needed more help than usual?: 1  Have you been hospitalized for one or more nights during the past 6 months?: 0  In general, do you see well?: 1  In general, do you have serious problems with your memory?: 1  Do you take more than three different medications every day?: 1  ISAR Score: 5            SBIRT 22yo+      Flowsheet Row Most Recent Value   Initial Alcohol Screen: US AUDIT-C     1. How often do you have a drink containing alcohol? 0 Filed at: 05/29/2025 1900   2. How many drinks containing alcohol do you have on a typical day you are drinking?  0 Filed at: 05/29/2025 1900   3a. Male UNDER 65: How often do you have five or more drinks on one occasion? 0 Filed at: 05/29/2025 1900   3b. FEMALE Any Age, or MALE 65+: How often do you have 4 or more drinks on one occassion? 0 Filed at: 05/29/2025 1900   Audit-C Score 0 Filed at: 05/29/2025 1900   DEANDRE: How many times in the past year have you...    Used an illegal drug or used a prescription medication for non-medical reasons? Never Filed at: 05/29/2025 1900                            History of Present Illness       Chief Complaint   Patient presents with    Depression     Pt report has hx of depression and feeling of anxiety over family issues, denies SI/HI and looking for resources.  Pt also reports new meds Xanax and pt feels heart racing and panic of having heart attack.       Past Medical History[1]   Past Surgical History[2]   Family History[3]   Social History[4]   E-Cigarette/Vaping    E-Cigarette Use Never User       E-Cigarette/Vaping Substances      I have reviewed and agree with the history as documented.     70-year-old female presenting to the emergency department due to anxiety and depression.  Patient notes  that she has a long history of issues that have built up recently, especially given that her daughter wants her to move across the country with her.  She has been working with her family doctor for the past month to find medication to help with this.  She has been placed on an antidepressant as well as given as needed Ativan.  Today she thought that she was going to die of a heart attack so along with the otherwise worsening symptomology she decided to come to the emergency department for further assistance.  No plan for SI or HI.        Review of Systems   All other systems reviewed and are negative.          Objective       ED Triage Vitals   Temperature Pulse Blood Pressure Respirations SpO2 Patient Position - Orthostatic VS   05/29/25 1904 05/29/25 1853 05/29/25 1853 05/29/25 1853 05/29/25 1853 05/29/25 1853   98.7 °F (37.1 °C) 77 (!) 184/84 18 99 % Sitting      Temp Source Heart Rate Source BP Location FiO2 (%) Pain Score    05/29/25 1904 05/29/25 1853 05/29/25 1853 -- 05/29/25 2122    Oral Monitor Right arm  No Pain      Vitals      Date and Time Temp Pulse SpO2 Resp BP Pain Score FACES Pain Rating User   05/29/25 2122 98.6 °F (37 °C) 75 97 % 17 177/84 No Pain -- [facility]   05/29/25 2030 -- 73 99 % 18 -- -- -- [facility]   05/29/25 1904 98.7 °F (37.1 °C) -- -- -- -- -- --    05/29/25 1853 -- 77 99 % 18 184/84 -- --             Physical Exam  Constitutional:       General: She is not in acute distress.     Appearance: Normal appearance. She is not ill-appearing or toxic-appearing.   HENT:      Head: Normocephalic and atraumatic.      Mouth/Throat:      Mouth: Mucous membranes are moist.     Eyes:      Extraocular Movements: Extraocular movements intact.     Pulmonary:      Effort: Pulmonary effort is normal.   Abdominal:      Palpations: Abdomen is soft.     Skin:     General: Skin is warm.     Neurological:      Mental Status: She is alert.     Psychiatric:         Mood and Affect: Mood normal.         Results  Reviewed       Procedure Component Value Units Date/Time    Urine Microscopic [[id number]]  (Abnormal) Collected: 05/29/25 2119    Lab Status: Final result Specimen: Urine, Clean Catch Updated: 05/29/25 2139     RBC, UA 0-1 /hpf      WBC, UA 10-20 /hpf      Epithelial Cells Occasional /hpf      Bacteria, UA Moderate /hpf      MUCUS THREADS Occasional    Urine culture [[id number]] Collected: 05/29/25 2119    Lab Status: In process Specimen: Urine, Clean Catch Updated: 05/29/25 2139    UA w Reflex to Microscopic w Reflex to Culture [[id number]]  (Abnormal) Collected: 05/29/25 2119    Lab Status: Final result Specimen: Urine, Clean Catch Updated: 05/29/25 2131     Color, UA Yellow     Clarity, UA Clear     Specific Gravity, UA 1.015     pH, UA 6.0     Leukocytes, UA Moderate     Nitrite, UA Negative     Protein, UA Negative mg/dl      Glucose, UA Negative mg/dl      Ketones, UA Trace mg/dl      Urobilinogen, UA <2.0 mg/dl      Bilirubin, UA Negative     Occult Blood, UA Negative    TSH, 3rd generation with Free T4 reflex [[id number]]  (Normal) Collected: 05/29/25 1909    Lab Status: Final result Specimen: Blood from Arm, Right Updated: 05/29/25 1950     TSH 3RD GENERATON 1.033 uIU/mL     HS Troponin 0hr (reflex protocol) [[id number]]  (Normal) Collected: 05/29/25 1909    Lab Status: Final result Specimen: Blood from Arm, Right Updated: 05/29/25 1943     hs TnI 0hr 4 ng/L     Comprehensive metabolic panel [[id number]]  (Abnormal) Collected: 05/29/25 1909    Lab Status: Final result Specimen: Blood from Arm, Right Updated: 05/29/25 1942     Sodium 139 mmol/L      Potassium 3.2 mmol/L      Chloride 102 mmol/L      CO2 28 mmol/L      ANION GAP 9 mmol/L      BUN 6 mg/dL      Creatinine 0.71 mg/dL      Glucose 110 mg/dL      Calcium 9.4 mg/dL      AST 15 U/L      ALT 16 U/L      Alkaline Phosphatase 41 U/L      Total Protein 6.6 g/dL      Albumin 4.1 g/dL      Total Bilirubin 0.72 mg/dL      eGFR 87 ml/min/1.73sq m     Narrative:       National Kidney Disease Foundation guidelines for Chronic Kidney Disease (CKD):     Stage 1 with normal or high GFR (GFR > 90 mL/min/1.73 square meters)    Stage 2 Mild CKD (GFR = 60-89 mL/min/1.73 square meters)    Stage 3A Moderate CKD (GFR = 45-59 mL/min/1.73 square meters)    Stage 3B Moderate CKD (GFR = 30-44 mL/min/1.73 square meters)    Stage 4 Severe CKD (GFR = 15-29 mL/min/1.73 square meters)    Stage 5 End Stage CKD (GFR <15 mL/min/1.73 square meters)  Note: GFR calculation is accurate only with a steady state creatinine    CBC and differential [[id number]] Collected: 05/29/25 1909    Lab Status: Final result Specimen: Blood from Arm, Right Updated: 05/29/25 1921     WBC 5.05 Thousand/uL      RBC 4.46 Million/uL      Hemoglobin 14.2 g/dL      Hematocrit 42.0 %      MCV 94 fL      MCH 31.8 pg      MCHC 33.8 g/dL      RDW 12.2 %      MPV 9.1 fL      Platelets 247 Thousands/uL      nRBC 0 /100 WBCs      Segmented % 51 %      Immature Grans % 0 %      Lymphocytes % 35 %      Monocytes % 12 %      Eosinophils Relative 1 %      Basophils Relative 1 %      Absolute Neutrophils 2.52 Thousands/µL      Absolute Immature Grans 0.02 Thousand/uL      Absolute Lymphocytes 1.77 Thousands/µL      Absolute Monocytes 0.61 Thousand/µL      Eosinophils Absolute 0.07 Thousand/µL      Basophils Absolute 0.06 Thousands/µL             No orders to display       Procedures    ED Medication and Procedure Management   Prior to Admission Medications   Prescriptions Last Dose Informant Patient Reported? Taking?   albuterol (PROVENTIL HFA,VENTOLIN HFA) 90 mcg/act inhaler   Yes No   Sig: Inhale 2 puffs every 6 (six) hours as needed for wheezing   aspirin 81 mg chewable tablet   Yes No   Sig: Chew 81 mg daily   docusate sodium (COLACE) 100 mg capsule   No No   Sig: Take 1 capsule (100 mg total) by mouth every 12 (twelve) hours   lisinopril (ZESTRIL) 10 mg tablet   Yes No   Sig: Take 10 mg by mouth daily   metoprolol tartrate  (LOPRESSOR) 25 mg tablet   Yes No   Sig: Take 25 mg by mouth every 12 (twelve) hours   polyethylene glycol (MIRALAX) 17 g packet   No No   Sig: Take 17 g by mouth daily for 7 days      Facility-Administered Medications: None     Discharge Medication List as of 5/29/2025  9:54 PM        START taking these medications    Details   cephalexin (KEFLEX) 500 mg capsule Take 1 capsule (500 mg total) by mouth every 12 (twelve) hours for 5 days, Starting Thu 5/29/2025, Until e 6/3/2025, Normal      hydrOXYzine HCL (ATARAX) 25 mg tablet Take 1 tablet (25 mg total) by mouth every 6 (six) hours, Starting u 5/29/2025, Normal           CONTINUE these medications which have NOT CHANGED    Details   albuterol (PROVENTIL HFA,VENTOLIN HFA) 90 mcg/act inhaler Inhale 2 puffs every 6 (six) hours as needed for wheezing, Historical Med      aspirin 81 mg chewable tablet Chew 81 mg daily, Historical Med      docusate sodium (COLACE) 100 mg capsule Take 1 capsule (100 mg total) by mouth every 12 (twelve) hours, Starting Sun 9/3/2023, Normal      lisinopril (ZESTRIL) 10 mg tablet Take 10 mg by mouth daily, Historical Med      metoprolol tartrate (LOPRESSOR) 25 mg tablet Take 25 mg by mouth every 12 (twelve) hours, Historical Med      polyethylene glycol (MIRALAX) 17 g packet Take 17 g by mouth daily for 7 days, Starting Sun 9/3/2023, Until Sun 9/10/2023, Normal             ED SEPSIS DOCUMENTATION   Time reflects when diagnosis was documented in both MDM as applicable and the Disposition within this note       Time User Action Codes Description Comment    5/29/2025  8:16 PM Eloisa Ann Add [F41.9] Anxiety     5/29/2025  8:16 PM Eloisa Ann Add [F33.1] Moderate episode of recurrent major depressive disorder (HCC)     5/29/2025  9:45 PM Yokubaitis, Alberto Add [F32.A] Depression     5/29/2025  9:45 PM Yokubaitis, Alberto Modify [F41.9] Anxiety     5/29/2025  9:45 PM Yokubaitis, Alberto Add [N39.0] UTI (urinary tract infection)                     [1]   Past Medical History:  Diagnosis Date    Arthritis     Hypertension     Mitral prolapse    [2]   Past Surgical History:  Procedure Laterality Date    HERNIA REPAIR     [3] No family history on file.  [4]   Social History  Tobacco Use    Smoking status: Never    Smokeless tobacco: Never   Vaping Use    Vaping status: Never Used   Substance Use Topics    Alcohol use: Never    Drug use: Never        Alberto Smalls MD  05/30/25 9685

## 2025-06-02 LAB
ATRIAL RATE: 71 BPM
P AXIS: 7 DEGREES
PR INTERVAL: 126 MS
QRS AXIS: -14 DEGREES
QRSD INTERVAL: 96 MS
QT INTERVAL: 384 MS
QTC INTERVAL: 417 MS
T WAVE AXIS: 3 DEGREES
VENTRICULAR RATE: 71 BPM

## 2025-06-02 PROCEDURE — 93010 ELECTROCARDIOGRAM REPORT: CPT | Performed by: INTERNAL MEDICINE

## 2025-06-25 ENCOUNTER — HOSPITAL ENCOUNTER (EMERGENCY)
Facility: HOSPITAL | Age: 70
End: 2025-06-26
Attending: EMERGENCY MEDICINE
Payer: MEDICARE

## 2025-06-25 DIAGNOSIS — F32.A DEPRESSION: ICD-10-CM

## 2025-06-25 DIAGNOSIS — F41.9 ANXIETY: Primary | ICD-10-CM

## 2025-06-25 LAB
ALBUMIN SERPL BCG-MCNC: 3.7 G/DL (ref 3.5–5)
ALP SERPL-CCNC: 42 U/L (ref 34–104)
ALT SERPL W P-5'-P-CCNC: 14 U/L (ref 7–52)
ANION GAP SERPL CALCULATED.3IONS-SCNC: 10 MMOL/L (ref 4–13)
AST SERPL W P-5'-P-CCNC: 12 U/L (ref 13–39)
BACTERIA UR QL AUTO: NORMAL /HPF
BASOPHILS # BLD AUTO: 0.04 THOUSANDS/ÂΜL (ref 0–0.1)
BASOPHILS NFR BLD AUTO: 1 % (ref 0–1)
BILIRUB SERPL-MCNC: 0.77 MG/DL (ref 0.2–1)
BILIRUB UR QL STRIP: NEGATIVE
BUN SERPL-MCNC: 6 MG/DL (ref 5–25)
CALCIUM SERPL-MCNC: 9.3 MG/DL (ref 8.4–10.2)
CARDIAC TROPONIN I PNL SERPL HS: 4 NG/L (ref ?–50)
CHLORIDE SERPL-SCNC: 103 MMOL/L (ref 96–108)
CLARITY UR: CLEAR
CO2 SERPL-SCNC: 23 MMOL/L (ref 21–32)
COLOR UR: ABNORMAL
CREAT SERPL-MCNC: 0.62 MG/DL (ref 0.6–1.3)
EOSINOPHIL # BLD AUTO: 0.07 THOUSAND/ÂΜL (ref 0–0.61)
EOSINOPHIL NFR BLD AUTO: 1 % (ref 0–6)
ERYTHROCYTE [DISTWIDTH] IN BLOOD BY AUTOMATED COUNT: 11.9 % (ref 11.6–15.1)
GFR SERPL CREATININE-BSD FRML MDRD: 92 ML/MIN/1.73SQ M
GLUCOSE SERPL-MCNC: 102 MG/DL (ref 65–140)
GLUCOSE UR STRIP-MCNC: NEGATIVE MG/DL
HCT VFR BLD AUTO: 36.9 % (ref 34.8–46.1)
HGB BLD-MCNC: 12.5 G/DL (ref 11.5–15.4)
HGB UR QL STRIP.AUTO: NEGATIVE
IMM GRANULOCYTES # BLD AUTO: 0.01 THOUSAND/UL (ref 0–0.2)
IMM GRANULOCYTES NFR BLD AUTO: 0 % (ref 0–2)
KETONES UR STRIP-MCNC: NEGATIVE MG/DL
LEUKOCYTE ESTERASE UR QL STRIP: ABNORMAL
LYMPHOCYTES # BLD AUTO: 1.87 THOUSANDS/ÂΜL (ref 0.6–4.47)
LYMPHOCYTES NFR BLD AUTO: 38 % (ref 14–44)
MCH RBC QN AUTO: 31.4 PG (ref 26.8–34.3)
MCHC RBC AUTO-ENTMCNC: 33.9 G/DL (ref 31.4–37.4)
MCV RBC AUTO: 93 FL (ref 82–98)
MONOCYTES # BLD AUTO: 0.56 THOUSAND/ÂΜL (ref 0.17–1.22)
MONOCYTES NFR BLD AUTO: 11 % (ref 4–12)
NEUTROPHILS # BLD AUTO: 2.41 THOUSANDS/ÂΜL (ref 1.85–7.62)
NEUTS SEG NFR BLD AUTO: 49 % (ref 43–75)
NITRITE UR QL STRIP: NEGATIVE
NON-SQ EPI CELLS URNS QL MICRO: NORMAL /HPF
NRBC BLD AUTO-RTO: 0 /100 WBCS
PH UR STRIP.AUTO: 6.5 [PH]
PLATELET # BLD AUTO: 230 THOUSANDS/UL (ref 149–390)
PMV BLD AUTO: 8.6 FL (ref 8.9–12.7)
POTASSIUM SERPL-SCNC: 3.2 MMOL/L (ref 3.5–5.3)
PROT SERPL-MCNC: 5.9 G/DL (ref 6.4–8.4)
PROT UR STRIP-MCNC: NEGATIVE MG/DL
RBC # BLD AUTO: 3.98 MILLION/UL (ref 3.81–5.12)
RBC #/AREA URNS AUTO: NORMAL /HPF
SODIUM SERPL-SCNC: 136 MMOL/L (ref 135–147)
SP GR UR STRIP.AUTO: 1.01 (ref 1–1.03)
UROBILINOGEN UR STRIP-ACNC: <2 MG/DL
WBC # BLD AUTO: 4.96 THOUSAND/UL (ref 4.31–10.16)
WBC #/AREA URNS AUTO: NORMAL /HPF

## 2025-06-25 PROCEDURE — 80307 DRUG TEST PRSMV CHEM ANLYZR: CPT | Performed by: EMERGENCY MEDICINE

## 2025-06-25 PROCEDURE — 36415 COLL VENOUS BLD VENIPUNCTURE: CPT | Performed by: EMERGENCY MEDICINE

## 2025-06-25 PROCEDURE — 84484 ASSAY OF TROPONIN QUANT: CPT | Performed by: EMERGENCY MEDICINE

## 2025-06-25 PROCEDURE — 81001 URINALYSIS AUTO W/SCOPE: CPT | Performed by: EMERGENCY MEDICINE

## 2025-06-25 PROCEDURE — 99284 EMERGENCY DEPT VISIT MOD MDM: CPT

## 2025-06-25 PROCEDURE — 93005 ELECTROCARDIOGRAM TRACING: CPT

## 2025-06-25 PROCEDURE — 99285 EMERGENCY DEPT VISIT HI MDM: CPT | Performed by: EMERGENCY MEDICINE

## 2025-06-25 PROCEDURE — 85025 COMPLETE CBC W/AUTO DIFF WBC: CPT | Performed by: EMERGENCY MEDICINE

## 2025-06-25 PROCEDURE — 80053 COMPREHEN METABOLIC PANEL: CPT | Performed by: EMERGENCY MEDICINE

## 2025-06-25 RX ORDER — ALPRAZOLAM 0.5 MG
0.5 TABLET ORAL ONCE
Status: COMPLETED | OUTPATIENT
Start: 2025-06-25 | End: 2025-06-25

## 2025-06-25 RX ORDER — METOPROLOL TARTRATE 25 MG/1
25 TABLET, FILM COATED ORAL ONCE
Status: COMPLETED | OUTPATIENT
Start: 2025-06-25 | End: 2025-06-25

## 2025-06-25 RX ADMIN — ALPRAZOLAM 0.5 MG: 0.5 TABLET ORAL at 22:55

## 2025-06-25 RX ADMIN — METOPROLOL TARTRATE 25 MG: 25 TABLET, FILM COATED ORAL at 22:56

## 2025-06-26 ENCOUNTER — HOSPITAL ENCOUNTER (INPATIENT)
Facility: HOSPITAL | Age: 70
LOS: 6 days | Discharge: HOME | DRG: 885 | End: 2025-07-02
Attending: STUDENT IN AN ORGANIZED HEALTH CARE EDUCATION/TRAINING PROGRAM | Admitting: PSYCHIATRY & NEUROLOGY
Payer: MEDICARE

## 2025-06-26 VITALS
OXYGEN SATURATION: 97 % | SYSTOLIC BLOOD PRESSURE: 143 MMHG | TEMPERATURE: 97.8 F | HEART RATE: 73 BPM | BODY MASS INDEX: 42.1 KG/M2 | HEIGHT: 68 IN | WEIGHT: 277.78 LBS | RESPIRATION RATE: 19 BRPM | DIASTOLIC BLOOD PRESSURE: 75 MMHG

## 2025-06-26 DIAGNOSIS — E11.9 DIABETES (HCC): ICD-10-CM

## 2025-06-26 DIAGNOSIS — F51.05 MOOD INSOMNIA (HCC): ICD-10-CM

## 2025-06-26 DIAGNOSIS — E55.9 VITAMIN D DEFICIENCY: ICD-10-CM

## 2025-06-26 DIAGNOSIS — R06.2 WHEEZING: ICD-10-CM

## 2025-06-26 DIAGNOSIS — E53.8 B12 DEFICIENCY: ICD-10-CM

## 2025-06-26 DIAGNOSIS — E53.8 FOLATE DEFICIENCY: ICD-10-CM

## 2025-06-26 DIAGNOSIS — I10 PRIMARY HYPERTENSION: ICD-10-CM

## 2025-06-26 DIAGNOSIS — F41.1 GAD (GENERALIZED ANXIETY DISORDER): ICD-10-CM

## 2025-06-26 DIAGNOSIS — F39 MOOD INSOMNIA (HCC): ICD-10-CM

## 2025-06-26 DIAGNOSIS — F33.2 SEVERE EPISODE OF RECURRENT MAJOR DEPRESSIVE DISORDER, WITHOUT PSYCHOTIC FEATURES (HCC): Primary | ICD-10-CM

## 2025-06-26 DIAGNOSIS — F41.9 ANXIETY: ICD-10-CM

## 2025-06-26 LAB
AMPHETAMINES SERPL QL SCN: NEGATIVE
ATRIAL RATE: 70 BPM
BARBITURATES UR QL: NEGATIVE
BENZODIAZ UR QL: NEGATIVE
COCAINE UR QL: NEGATIVE
ETHANOL SERPL-MCNC: <10 MG/DL
FENTANYL UR QL SCN: NEGATIVE
HYDROCODONE UR QL SCN: NEGATIVE
METHADONE UR QL: NEGATIVE
OPIATES UR QL SCN: NEGATIVE
OXYCODONE+OXYMORPHONE UR QL SCN: NEGATIVE
P AXIS: 31 DEGREES
PCP UR QL: NEGATIVE
PR INTERVAL: 156 MS
QRS AXIS: 0 DEGREES
QRSD INTERVAL: 102 MS
QT INTERVAL: 402 MS
QTC INTERVAL: 434 MS
T WAVE AXIS: 24 DEGREES
THC UR QL: NEGATIVE
TSH SERPL DL<=0.05 MIU/L-ACNC: 0.97 UIU/ML (ref 0.45–4.5)
VENTRICULAR RATE: 70 BPM

## 2025-06-26 PROCEDURE — 36415 COLL VENOUS BLD VENIPUNCTURE: CPT | Performed by: EMERGENCY MEDICINE

## 2025-06-26 PROCEDURE — GZ59ZZZ INDIVIDUAL PSYCHOTHERAPY, PSYCHOPHYSIOLOGICAL: ICD-10-PCS | Performed by: PSYCHIATRY & NEUROLOGY

## 2025-06-26 PROCEDURE — GZHZZZZ GROUP PSYCHOTHERAPY: ICD-10-PCS | Performed by: PSYCHIATRY & NEUROLOGY

## 2025-06-26 PROCEDURE — 93010 ELECTROCARDIOGRAM REPORT: CPT | Performed by: INTERNAL MEDICINE

## 2025-06-26 PROCEDURE — 82077 ASSAY SPEC XCP UR&BREATH IA: CPT | Performed by: EMERGENCY MEDICINE

## 2025-06-26 PROCEDURE — 84443 ASSAY THYROID STIM HORMONE: CPT | Performed by: EMERGENCY MEDICINE

## 2025-06-26 RX ORDER — HYDROXYZINE HYDROCHLORIDE 50 MG/1
50 TABLET, FILM COATED ORAL
Status: DISCONTINUED | OUTPATIENT
Start: 2025-06-26 | End: 2025-07-02 | Stop reason: HOSPADM

## 2025-06-26 RX ORDER — MAGNESIUM HYDROXIDE/ALUMINUM HYDROXICE/SIMETHICONE 120; 1200; 1200 MG/30ML; MG/30ML; MG/30ML
30 SUSPENSION ORAL EVERY 4 HOURS PRN
Status: CANCELLED | OUTPATIENT
Start: 2025-06-26

## 2025-06-26 RX ORDER — RISPERIDONE 1 MG/1
1 TABLET ORAL
Status: CANCELLED | OUTPATIENT
Start: 2025-06-26

## 2025-06-26 RX ORDER — PROPRANOLOL HYDROCHLORIDE 10 MG/1
5 TABLET ORAL EVERY 8 HOURS PRN
Status: CANCELLED | OUTPATIENT
Start: 2025-06-26

## 2025-06-26 RX ORDER — RISPERIDONE 0.5 MG/1
0.5 TABLET ORAL
Status: DISCONTINUED | OUTPATIENT
Start: 2025-06-26 | End: 2025-07-02 | Stop reason: HOSPADM

## 2025-06-26 RX ORDER — HYDROXYZINE HYDROCHLORIDE 25 MG/1
50 TABLET, FILM COATED ORAL
Status: CANCELLED | OUTPATIENT
Start: 2025-06-26

## 2025-06-26 RX ORDER — RISPERIDONE 1 MG/1
0.5 TABLET ORAL
Status: CANCELLED | OUTPATIENT
Start: 2025-06-26

## 2025-06-26 RX ORDER — MAGNESIUM HYDROXIDE/ALUMINUM HYDROXICE/SIMETHICONE 120; 1200; 1200 MG/30ML; MG/30ML; MG/30ML
30 SUSPENSION ORAL EVERY 4 HOURS PRN
Status: DISCONTINUED | OUTPATIENT
Start: 2025-06-26 | End: 2025-07-02 | Stop reason: HOSPADM

## 2025-06-26 RX ORDER — GABAPENTIN 100 MG/1
100 CAPSULE ORAL 3 TIMES DAILY
Status: DISCONTINUED | OUTPATIENT
Start: 2025-06-26 | End: 2025-06-26 | Stop reason: HOSPADM

## 2025-06-26 RX ORDER — LISINOPRIL 10 MG/1
10 TABLET ORAL DAILY
Status: DISCONTINUED | OUTPATIENT
Start: 2025-06-26 | End: 2025-06-26 | Stop reason: HOSPADM

## 2025-06-26 RX ORDER — TRAZODONE HYDROCHLORIDE 50 MG/1
50 TABLET ORAL
Status: DISCONTINUED | OUTPATIENT
Start: 2025-06-26 | End: 2025-07-02 | Stop reason: HOSPADM

## 2025-06-26 RX ORDER — RISPERIDONE 1 MG/1
1 TABLET ORAL
Status: DISCONTINUED | OUTPATIENT
Start: 2025-06-26 | End: 2025-07-02 | Stop reason: HOSPADM

## 2025-06-26 RX ORDER — PROPRANOLOL HYDROCHLORIDE 10 MG/1
5 TABLET ORAL EVERY 8 HOURS PRN
Status: DISCONTINUED | OUTPATIENT
Start: 2025-06-26 | End: 2025-07-02 | Stop reason: HOSPADM

## 2025-06-26 RX ORDER — HYDROXYZINE HYDROCHLORIDE 25 MG/1
25 TABLET, FILM COATED ORAL
Status: CANCELLED | OUTPATIENT
Start: 2025-06-26

## 2025-06-26 RX ORDER — ALBUTEROL SULFATE 90 UG/1
2 INHALANT RESPIRATORY (INHALATION) EVERY 6 HOURS PRN
Status: DISCONTINUED | OUTPATIENT
Start: 2025-06-26 | End: 2025-06-26 | Stop reason: HOSPADM

## 2025-06-26 RX ORDER — RISPERIDONE 0.25 MG/1
0.25 TABLET ORAL
Status: CANCELLED | OUTPATIENT
Start: 2025-06-26

## 2025-06-26 RX ORDER — TRAZODONE HYDROCHLORIDE 50 MG/1
50 TABLET ORAL
Status: CANCELLED | OUTPATIENT
Start: 2025-06-26

## 2025-06-26 RX ORDER — HYDROXYZINE HYDROCHLORIDE 25 MG/1
25 TABLET, FILM COATED ORAL
Status: DISCONTINUED | OUTPATIENT
Start: 2025-06-26 | End: 2025-07-02 | Stop reason: HOSPADM

## 2025-06-26 RX ORDER — ACETAMINOPHEN 325 MG/1
975 TABLET ORAL EVERY 6 HOURS PRN
Status: CANCELLED | OUTPATIENT
Start: 2025-06-26

## 2025-06-26 RX ORDER — METOPROLOL TARTRATE 25 MG/1
25 TABLET, FILM COATED ORAL EVERY 12 HOURS SCHEDULED
Status: DISCONTINUED | OUTPATIENT
Start: 2025-06-26 | End: 2025-06-26 | Stop reason: HOSPADM

## 2025-06-26 RX ORDER — PANTOPRAZOLE SODIUM 20 MG/1
20 TABLET, DELAYED RELEASE ORAL DAILY
Status: DISCONTINUED | OUTPATIENT
Start: 2025-06-26 | End: 2025-06-26 | Stop reason: HOSPADM

## 2025-06-26 RX ORDER — ACETAMINOPHEN 325 MG/1
650 TABLET ORAL EVERY 4 HOURS PRN
Status: DISCONTINUED | OUTPATIENT
Start: 2025-06-26 | End: 2025-07-02 | Stop reason: HOSPADM

## 2025-06-26 RX ORDER — RISPERIDONE 0.25 MG/1
0.25 TABLET ORAL
Status: DISCONTINUED | OUTPATIENT
Start: 2025-06-26 | End: 2025-07-02 | Stop reason: HOSPADM

## 2025-06-26 RX ORDER — HYDROXYZINE HYDROCHLORIDE 25 MG/1
25 TABLET, FILM COATED ORAL EVERY 6 HOURS PRN
Status: DISCONTINUED | OUTPATIENT
Start: 2025-06-26 | End: 2025-06-26 | Stop reason: HOSPADM

## 2025-06-26 RX ORDER — HALOPERIDOL 5 MG/ML
5 INJECTION INTRAMUSCULAR
Status: DISCONTINUED | OUTPATIENT
Start: 2025-06-26 | End: 2025-07-02 | Stop reason: HOSPADM

## 2025-06-26 RX ORDER — ASPIRIN 81 MG/1
81 TABLET, CHEWABLE ORAL DAILY
Status: DISCONTINUED | OUTPATIENT
Start: 2025-06-26 | End: 2025-06-26 | Stop reason: HOSPADM

## 2025-06-26 RX ORDER — ACETAMINOPHEN 325 MG/1
650 TABLET ORAL EVERY 4 HOURS PRN
Status: CANCELLED | OUTPATIENT
Start: 2025-06-26

## 2025-06-26 RX ORDER — POTASSIUM CHLORIDE 1500 MG/1
40 TABLET, EXTENDED RELEASE ORAL ONCE
Status: COMPLETED | OUTPATIENT
Start: 2025-06-26 | End: 2025-06-26

## 2025-06-26 RX ORDER — HALOPERIDOL 5 MG/ML
5 INJECTION INTRAMUSCULAR
Status: CANCELLED | OUTPATIENT
Start: 2025-06-26

## 2025-06-26 RX ORDER — OLANZAPINE 5 MG/1
10 TABLET, ORALLY DISINTEGRATING ORAL ONCE
Status: DISCONTINUED | OUTPATIENT
Start: 2025-06-26 | End: 2025-06-26 | Stop reason: HOSPADM

## 2025-06-26 RX ORDER — ACETAMINOPHEN 325 MG/1
975 TABLET ORAL EVERY 6 HOURS PRN
Status: DISCONTINUED | OUTPATIENT
Start: 2025-06-26 | End: 2025-07-02 | Stop reason: HOSPADM

## 2025-06-26 RX ADMIN — METOPROLOL TARTRATE 25 MG: 25 TABLET, FILM COATED ORAL at 21:18

## 2025-06-26 RX ADMIN — METOPROLOL TARTRATE 25 MG: 25 TABLET, FILM COATED ORAL at 09:32

## 2025-06-26 RX ADMIN — LISINOPRIL 10 MG: 10 TABLET ORAL at 09:32

## 2025-06-26 RX ADMIN — GABAPENTIN 100 MG: 100 CAPSULE ORAL at 09:32

## 2025-06-26 RX ADMIN — HYDROXYZINE HYDROCHLORIDE 25 MG: 25 TABLET, FILM COATED ORAL at 15:06

## 2025-06-26 RX ADMIN — ASPIRIN 81 MG: 81 TABLET, CHEWABLE ORAL at 09:32

## 2025-06-26 RX ADMIN — Medication 3 MG: at 00:28

## 2025-06-26 RX ADMIN — GABAPENTIN 100 MG: 100 CAPSULE ORAL at 21:18

## 2025-06-26 RX ADMIN — POTASSIUM CHLORIDE 40 MEQ: 1500 TABLET, EXTENDED RELEASE ORAL at 17:02

## 2025-06-26 RX ADMIN — SERTRALINE HYDROCHLORIDE 50 MG: 50 TABLET ORAL at 09:32

## 2025-06-26 RX ADMIN — HYDROXYZINE HYDROCHLORIDE 50 MG: 50 TABLET ORAL at 23:14

## 2025-06-26 NOTE — ED NOTES
Note for the EDMD that was requested by [facility] was faxed to them for review     Eloisa TINSLEY

## 2025-06-26 NOTE — ED NOTES
VERN received a call from Echo at [facility] stating the patient had been accepted.     1717: VERN received a call back from Echo at [facility] stating that Dr Bloch had denied the admission stating the patient did not have enough going on to warrant an inpatient stay.     Eloisa TINSLEY

## 2025-06-26 NOTE — ED NOTES
"69 y/o female presented to the ED. Patient c/o her BP being high \"all day\". States she was anxious about having a cardiac event. PES went in to speak with the patient to complete the crisis and safety assessment. Patient stated her BP was zeinab high all day. Patient reports having panic attacks. Patient tried to use all of the coping skills her therapist had taught her but nothing was working. The patient kept feeling like she was going to have a heart attack. Patient reports having irrational thoughts and fears she is going to die. The patient reports she can't stop thinking she is going to have a heart attack and die. The patient reports that the irrational thoughts cause her panic attacks. Nothing is working. She called her therapist office and they told her to come to the ED.The patient denies SI/HI/AVH. The patient reports that the panic attacks keep getting worse and worse every day. The patient tried to knit to calm her mind but its not helping. The patient is afraid she will not make it to her next birthday. PES asked what happened with the PHP program that was recommended the last time. Patient stated she was told they did not take her insurance. Patient then admitted she panicked and that is why she did not go. Patient admits she is scared to go to the inpatient unit but thinks it will help her. Patient is willing to go voluntarily to inpatient BHU.     Eloisa TINSLEY   "

## 2025-06-26 NOTE — ED NOTES
Pt ambulates to bathroom, slow steady gait with a cane. Toiletry given. Pt is cleaning up with set up assist      Nikki Reed RN  06/26/25 [id number]

## 2025-06-26 NOTE — EMTALA/ACUTE CARE TRANSFER
[facility] EMERGENCY DEPARTMENT  [address] [address]  Dept: [phone]      EMTALA TRANSFER CONSENT    NAME Juanita Willard                                         1955                              MRN [id number]    I have been informed of my rights regarding examination, treatment, and transfer   by Dr. Marvel Hull DO    Benefits: Specialized equipment and/or services available at the receiving facility (Include comment)________________________    Risks: Potential for delay in receiving treatment, Potential deterioration of medical condition, Increased discomfort during transfer, Possible worsening of condition or death during transfer      Consent for Transfer:  I acknowledge that my medical condition has been evaluated and explained to me by the emergency department physician or other qualified medical person and/or my attending physician, who has recommended that I be transferred to the service of  Accepting Physician: Dr. Diana at Accepting Facility Name, City & State : [facility]. The above potential benefits of such transfer, the potential risks associated with such transfer, and the probable risks of not being transferred have been explained to me, and I fully understand them.  The doctor has explained that, in my case, the benefits of transfer outweigh the risks.  I agree to be transferred.    I authorize the performance of emergency medical procedures and treatments upon me in both transit and upon arrival at the receiving facility.  Additionally, I authorize the release of any and all medical records to the receiving facility and request they be transported with me, if possible.  I understand that the safest mode of transportation during a medical emergency is an ambulance and that the Hospital advocates the use of this mode of transport. Risks of traveling to the receiving facility by car, including absence of medical control, life sustaining equipment,  such as oxygen, and medical personnel has been explained to me and I fully understand them.    (WEN CORRECT BOX BELOW)  [  ]  I consent to the stated transfer and to be transported by ambulance/helicopter.  [  ]  I consent to the stated transfer, but refuse transportation by ambulance and accept full responsibility for my transportation by car.  I understand the risks of non-ambulance transfers and I exonerate the Hospital and its staff from any deterioration in my condition that results from this refusal.    X___________________________________________    DATE  25  TIME________  Signature of patient or legally responsible individual signing on patient behalf           RELATIONSHIP TO PATIENT_________________________          Provider Certification    NAME Juanita Willard                                        [facility] 1955                              MRN [id number]    A medical screening exam was performed on the above named patient.  Based on the examination:    Condition Necessitating Transfer The primary encounter diagnosis was Anxiety. A diagnosis of Depression was also pertinent to this visit.    Patient Condition: The patient has been stabilized such that within reasonable medical probability, no material deterioration of the patient condition or the condition of the unborn child(mahnaz) is likely to result from the transfer    Reason for Transfer: Level of Care needed not available at this facility    Transfer Requirements: [facility]   Space available and qualified personnel available for treatment as acknowledged by    Agreed to accept transfer and to provide appropriate medical treatment as acknowledged by       Dr. Diana  Appropriate medical records of the examination and treatment of the patient are provided at the time of transfer   STAFF INITIAL WHEN COMPLETED _______  Transfer will be performed by qualified personnel from    and appropriate transfer equipment as required, including  the use of necessary and appropriate life support measures.    Provider Certification: I have examined the patient and explained the following risks and benefits of being transferred/refusing transfer to the patient/family:  General risk, such as traffic hazards, adverse weather conditions, rough terrain or turbulence, possible failure of equipment (including vehicle or aircraft), or consequences of actions of persons outside the control of the transport personnel      Based on these reasonable risks and benefits to the patient and/or the unborn child(mahnaz), and based upon the information available at the time of the patient’s examination, I certify that the medical benefits reasonably to be expected from the provision of appropriate medical treatments at another medical facility outweigh the increasing risks, if any, to the individual’s medical condition, and in the case of labor to the unborn child, from effecting the transfer.    X____________________________________________ DATE 06/26/25        TIME_______      ORIGINAL - SEND TO MEDICAL RECORDS   COPY - SEND WITH PATIENT DURING TRANSFER   98.4

## 2025-06-26 NOTE — ED NOTES
Patients daughter Jihan called for an update. I got consent from the patient to update Jihan. Jihan stated that she will come by to see patient in the AM if she has not been moved by then. Patient made aware of this.     Kari Payton RN  06/26/25 [id number]

## 2025-06-26 NOTE — ED PROVIDER NOTES
"Time reflects when diagnosis was documented in both MDM as applicable and the Disposition within this note       Time User Action Codes Description Comment    6/25/2025  9:41 PM Pauline Dumont Add [F41.9] Anxiety     6/25/2025  9:41 PM Pauline Dumont Add [F32.A] Depression           ED Disposition       ED Disposition   Transfer to Behavioral Health Condition   --    Date/Time   Wed Jun 25, 2025  9:41 PM    Comment   Juanita Willard should be transferred out to [facility] and has been medically cleared.               Assessment & Plan       Medical Decision Making  Pt. Is medically cleared for [facility] evaluation and disposition.  Discussed with crisis, pt. Will sign in voluntary.  2300- awaiting bed search an placement.    Amount and/or Complexity of Data Reviewed  Labs: ordered.    Risk  Prescription drug management.  Decision regarding hospitalization.             Medications   ALPRAZolam (XANAX) tablet 0.5 mg (has no administration in time range)       ED Risk Strat Scores                    No data recorded        SBIRT 20yo+      Flowsheet Row Most Recent Value   Initial Alcohol Screen: US AUDIT-C     1. How often do you have a drink containing alcohol? 0 Filed at: 06/25/2025 1913   2. How many drinks containing alcohol do you have on a typical day you are drinking?  0 Filed at: 06/25/2025 1913   3a. Male UNDER 65: How often do you have five or more drinks on one occasion? 0 Filed at: 06/25/2025 1913   3b. FEMALE Any Age, or MALE 65+: How often do you have 4 or more drinks on one occassion? 0 Filed at: 06/25/2025 1913   Audit-C Score 0 Filed at: 06/25/2025 1913   DEANDRE: How many times in the past year have you...    Used an illegal drug or used a prescription medication for non-medical reasons? Never Filed at: 06/25/2025 1913                            History of Present Illness       Chief Complaint   Patient presents with    Hypertension     Patient c/o her BP being high \"all day\". States she was anxious about " having a cardiac event.        Past Medical History[1]   Past Surgical History[2]   Family History[3]   Social History[4]   E-Cigarette/Vaping    E-Cigarette Use Never User       E-Cigarette/Vaping Substances      I have reviewed and agree with the history as documented.     71 yo female with history of HTN and depression.  She had an episode of feeling like her body was shaking 5 days ago and a general sense of electricity feeling going through her body.  It went away on its own.  She told her therapist about it then a couple days ago and her therapist told her she should have come to the ER because she may have been having a heart attack.  Since then she has been anxious and obsessing about the damage she may have done to her heart.  She says she is very isolated from family and doesn't have any friends.  She says she gets so anxious about her health and feels like she is going to die or something bad is going to happen and then it keeps going around her thoughts and she can't stop it.  No chest pain, fever, cough, vomiting.  Her anti-depressant was just increased 12 days ago and she thinks it is making her more anxious.  [facility]90 and she thought she was going to have a heart attack.      History provided by:  Patient   used: No    Hypertension  Associated symptoms: anxiety    Associated symptoms: no chest pain, no fever, no shortness of breath and not vomiting        Review of Systems   Constitutional:  Negative for fever.   Respiratory:  Negative for cough and shortness of breath.    Cardiovascular:  Negative for chest pain.   Gastrointestinal:  Negative for diarrhea and vomiting.   Musculoskeletal:  Negative for back pain.   Skin:  Negative for rash.   Psychiatric/Behavioral:  Positive for dysphoric mood and sleep disturbance. Negative for self-injury and suicidal ideas. The patient is nervous/anxious.            Objective       ED Triage Vitals  [06/25/25 1908]   Temperature Pulse Blood Pressure Respirations SpO2 Patient Position - Orthostatic VS   98.4 °F (36.9 °C) 74 162/72 18 98 % Lying      Temp Source Heart Rate Source BP Location FiO2 (%) Pain Score    Temporal Monitor Left arm -- No Pain      Vitals      Date and Time Temp Pulse SpO2 Resp BP Pain Score FACES Pain Rating User   06/25/25 2231 98.4 °F (36.9 °C) 88 98 % 18 150/74 -- -- HARINDER   06/25/25 1908 98.4 °F (36.9 °C) 74 98 % 18 162/72 No Pain -- MB            Physical Exam  Vitals and nursing note reviewed.   Constitutional:       General: She is not in acute distress.     Appearance: She is well-developed. She is not ill-appearing or diaphoretic.   HENT:      Head: Normocephalic and atraumatic.     Eyes:      Conjunctiva/sclera: Conjunctivae normal.      Pupils: Pupils are equal, round, and reactive to light.       Cardiovascular:      Rate and Rhythm: Normal rate and regular rhythm.      Heart sounds: Normal heart sounds. No murmur heard.  Pulmonary:      Effort: Pulmonary effort is normal. No respiratory distress.      Breath sounds: Normal breath sounds.   Abdominal:      General: Bowel sounds are normal. There is no distension.      Palpations: Abdomen is soft.      Tenderness: There is no abdominal tenderness.     Musculoskeletal:         General: No deformity. Normal range of motion.      Cervical back: Normal range of motion and neck supple.      Right lower leg: No edema.      Left lower leg: No edema.     Skin:     General: Skin is warm and dry.      Coloration: Skin is not pale.      Findings: No rash.     Neurological:      Mental Status: She is alert.      Cranial Nerves: No cranial nerve deficit.     Psychiatric:         Behavior: Behavior normal.         Results Reviewed       Procedure Component Value Units Date/Time    UA (URINE) with reflex to Scope [[id number]]     Lab Status: No result Specimen: Urine     Rapid drug screen, urine [[id number]]     Lab Status: No result Specimen:  Urine     HS Troponin I 4hr [[id number]]     Lab Status: No result Specimen: Blood     HS Troponin 0hr (reflex protocol) [[id number]]  (Normal) Collected: 06/25/25 1944    Lab Status: Final result Specimen: Blood from Arm, Left Updated: 06/25/25 2019     hs TnI 0hr 4 ng/L     HS Troponin I 2hr [[id number]]     Lab Status: No result Specimen: Blood     Comprehensive metabolic panel [[id number]]  (Abnormal) Collected: 06/25/25 1944    Lab Status: Final result Specimen: Blood from Arm, Left Updated: 06/25/25 2016     Sodium 136 mmol/L      Potassium 3.2 mmol/L      Chloride 103 mmol/L      CO2 23 mmol/L      ANION GAP 10 mmol/L      BUN 6 mg/dL      Creatinine 0.62 mg/dL      Glucose 102 mg/dL      Calcium 9.3 mg/dL      AST 12 U/L      ALT 14 U/L      Alkaline Phosphatase 42 U/L      Total Protein 5.9 g/dL      Albumin 3.7 g/dL      Total Bilirubin 0.77 mg/dL      eGFR 92 ml/min/1.73sq m     Narrative:      National Kidney Disease Foundation guidelines for Chronic Kidney Disease (CKD):     Stage 1 with normal or high GFR (GFR > 90 mL/min/1.73 square meters)    Stage 2 Mild CKD (GFR = 60-89 mL/min/1.73 square meters)    Stage 3A Moderate CKD (GFR = 45-59 mL/min/1.73 square meters)    Stage 3B Moderate CKD (GFR = 30-44 mL/min/1.73 square meters)    Stage 4 Severe CKD (GFR = 15-29 mL/min/1.73 square meters)    Stage 5 End Stage CKD (GFR <15 mL/min/1.73 square meters)  Note: GFR calculation is accurate only with a steady state creatinine    CBC and differential [[id number]]  (Abnormal) Collected: 06/25/25 1944    Lab Status: Final result Specimen: Blood from Arm, Left Updated: 06/25/25 1954     WBC 4.96 Thousand/uL      RBC 3.98 Million/uL      Hemoglobin 12.5 g/dL      Hematocrit 36.9 %      MCV 93 fL      MCH 31.4 pg      MCHC 33.9 g/dL      RDW 11.9 %      MPV 8.6 fL      Platelets 230 Thousands/uL      nRBC 0 /100 WBCs      Segmented % 49 %      Immature Grans % 0 %      Lymphocytes % 38 %      Monocytes % 11 %       Eosinophils Relative 1 %      Basophils Relative 1 %      Absolute Neutrophils 2.41 Thousands/µL      Absolute Immature Grans 0.01 Thousand/uL      Absolute Lymphocytes 1.87 Thousands/µL      Absolute Monocytes 0.56 Thousand/µL      Eosinophils Absolute 0.07 Thousand/µL      Basophils Absolute 0.04 Thousands/µL             No orders to display       ECG 12 Lead Documentation Only    Date/Time: 6/25/2025 7:55 PM    Performed by: Pauline Dumont MD  Authorized by: Pauline Dumont MD    Indications / Diagnosis:  High BP, anxiety  ECG reviewed by me, the ED Provider: yes    Patient location:  ED  Previous ECG:     Previous ECG:  Unavailable  Interpretation:     Interpretation: normal    Rate:     ECG rate:  70    ECG rate assessment: normal    Rhythm:     Rhythm: sinus rhythm    Ectopy:     Ectopy: none    QRS:     QRS axis:  Normal  Conduction:     Conduction: normal    ST segments:     ST segments:  Normal  T waves:     T waves: normal        ED Medication and Procedure Management   Prior to Admission Medications   Prescriptions Last Dose Informant Patient Reported? Taking?   albuterol (PROVENTIL HFA,VENTOLIN HFA) 90 mcg/act inhaler Past Month  Yes Yes   Sig: Inhale 2 puffs every 6 (six) hours as needed for wheezing   aspirin 81 mg chewable tablet 6/25/2025 Morning  Yes Yes   Sig: Chew 81 mg in the morning.   docusate sodium (COLACE) 100 mg capsule Past Week  No Yes   Sig: Take 1 capsule (100 mg total) by mouth every 12 (twelve) hours   hydrOXYzine HCL (ATARAX) 25 mg tablet 6/25/2025 Evening  No Yes   Sig: Take 1 tablet (25 mg total) by mouth every 6 (six) hours   lisinopril (ZESTRIL) 10 mg tablet 6/25/2025 Morning  Yes Yes   Sig: Take 10 mg by mouth in the morning.   metoprolol tartrate (LOPRESSOR) 25 mg tablet 6/25/2025 Morning  Yes Yes   Sig: Take 25 mg by mouth every 12 (twelve) hours   polyethylene glycol (MIRALAX) 17 g packet   No No   Sig: Take 17 g by mouth daily for 7 days      Facility-Administered  Medications: None     Patient's Medications   Discharge Prescriptions    No medications on file     No discharge procedures on file.  ED SEPSIS DOCUMENTATION   Time reflects when diagnosis was documented in both MDM as applicable and the Disposition within this note       Time User Action Codes Description Comment    6/25/2025  9:41 PM Pauline Dumont Add [F41.9] Anxiety     6/25/2025  9:41 PM Pauline Dumont Add [F32.A] Depression                      [1]   Past Medical History:  Diagnosis Date    Arthritis     Hypertension     Mitral prolapse    [2]   Past Surgical History:  Procedure Laterality Date    HERNIA REPAIR     [3] No family history on file.  [4]   Social History  Tobacco Use    Smoking status: Never    Smokeless tobacco: Never   Vaping Use    Vaping status: Never Used   Substance Use Topics    Alcohol use: Never    Drug use: Never        Pauline Dumont MD  06/25/25 0721

## 2025-06-26 NOTE — ED NOTES
0740- call placed to [facility] to check on status of referral, no answer, message was left.      1000-   call placed to [facility] to check on status of referral, no answer, message was left.      1150- call placed to [facility] to check on status of referral, no answer, message was left.    1320-  call placed to [facility] to check on status of referral and spoke with Antony who said someone spoke with crisis earlier at [facility] and they had a bed for patient. He asked to call back so he could look into it.     1340-Antony called back and asked that we refax the referral so they can review,  which [facility] did

## 2025-06-26 NOTE — ED NOTES
Pt cleaned up and changed into new  scrubs. Lunch tray at bedside and she is eating now. Given a book to read     Nikki Reed RN  06/26/25 7824       Nikki Reed RN  06/26/25 6828

## 2025-06-26 NOTE — ED NOTES
As per nursing supervisor, patient must be placed on virtual monitoring. Patient calm and cooperative.      Nancy Xiao RN  06/25/25 6218

## 2025-06-26 NOTE — ED CARE HANDOFF
Emergency Department Sign Out Note        Sign out and transfer of care from Dr. Lopez. See Separate Emergency Department note.     The patient, Juanita Willard, was evaluated by the previous provider for Mental Health.    Workup Completed:  Lab work    ED Course / Workup Pending (followup):  Patient pending voluntary inpatient psychiatric bed search.      Patient was referred for bed search to [facility].  They were concerned about the troponin level of 4.  This is a high-sensitivity assay and a level 4 is considered negative.  This requires no further inpatient workup at this time as per cardiology protocol.  No data recorded                             Procedures  Medical Decision Making  Amount and/or Complexity of Data Reviewed  Labs: ordered.    Risk  OTC drugs.  Prescription drug management.  Decision regarding hospitalization.            Disposition  Final diagnoses:   Anxiety   Depression     Time reflects when diagnosis was documented in both MDM as applicable and the Disposition within this note       Time User Action Codes Description Comment    6/25/2025  9:41 PM Pauline Dumont Add [F41.9] Anxiety     6/25/2025  9:41 PM Pauline Dumont Add [F32.A] Depression           ED Disposition       ED Disposition   Transfer to Behavioral Health Condition   --    Date/Time   Wed Jun 25, 2025  9:41 PM    Comment   Juanita Willard should be transferred out to [facility] and has been medically cleared.               Follow-up Information    None       Patient's Medications   Discharge Prescriptions    No medications on file     No discharge procedures on file.       ED Provider  Electronically Signed by     Marvel Hull,   06/26/25 0718       Marvel Hull,   06/26/25 4688

## 2025-06-27 PROBLEM — F41.1 GAD (GENERALIZED ANXIETY DISORDER): Status: ACTIVE | Noted: 2025-06-27

## 2025-06-27 PROBLEM — E66.01 MORBID OBESITY (HCC): Status: ACTIVE | Noted: 2025-06-27

## 2025-06-27 PROBLEM — I10 HYPERTENSION: Status: ACTIVE | Noted: 2025-06-27

## 2025-06-27 PROBLEM — E55.9 VITAMIN D DEFICIENCY: Status: ACTIVE | Noted: 2025-06-27

## 2025-06-27 PROBLEM — F39 MOOD INSOMNIA (HCC): Status: ACTIVE | Noted: 2025-06-27

## 2025-06-27 PROBLEM — E53.8 B12 DEFICIENCY: Status: ACTIVE | Noted: 2025-06-27

## 2025-06-27 PROBLEM — K21.9 GERD WITHOUT ESOPHAGITIS: Status: ACTIVE | Noted: 2025-06-27

## 2025-06-27 PROBLEM — E53.8 FOLATE DEFICIENCY: Status: ACTIVE | Noted: 2025-06-27

## 2025-06-27 PROBLEM — Z00.8 MEDICAL CLEARANCE FOR PSYCHIATRIC ADMISSION: Status: ACTIVE | Noted: 2025-06-27

## 2025-06-27 PROBLEM — F33.2 SEVERE EPISODE OF RECURRENT MAJOR DEPRESSIVE DISORDER, WITHOUT PSYCHOTIC FEATURES (HCC): Status: ACTIVE | Noted: 2025-06-27

## 2025-06-27 PROBLEM — F51.05 MOOD INSOMNIA (HCC): Status: ACTIVE | Noted: 2025-06-27

## 2025-06-27 LAB
25(OH)D3 SERPL-MCNC: <7 NG/ML (ref 30–100)
ALBUMIN SERPL BCG-MCNC: 3.5 G/DL (ref 3.5–5)
ALP SERPL-CCNC: 37 U/L (ref 34–104)
ALT SERPL W P-5'-P-CCNC: 12 U/L (ref 7–52)
ANION GAP SERPL CALCULATED.3IONS-SCNC: 5 MMOL/L (ref 4–13)
AST SERPL W P-5'-P-CCNC: 13 U/L (ref 13–39)
BASOPHILS # BLD AUTO: 0.05 THOUSANDS/ÂΜL (ref 0–0.1)
BASOPHILS NFR BLD AUTO: 1 % (ref 0–1)
BILIRUB SERPL-MCNC: 0.53 MG/DL (ref 0.2–1)
BUN SERPL-MCNC: 11 MG/DL (ref 5–25)
CALCIUM SERPL-MCNC: 8.9 MG/DL (ref 8.4–10.2)
CHLORIDE SERPL-SCNC: 106 MMOL/L (ref 96–108)
CHOLEST SERPL-MCNC: 176 MG/DL (ref ?–200)
CO2 SERPL-SCNC: 28 MMOL/L (ref 21–32)
CREAT SERPL-MCNC: 0.62 MG/DL (ref 0.6–1.3)
EOSINOPHIL # BLD AUTO: 0.17 THOUSAND/ÂΜL (ref 0–0.61)
EOSINOPHIL NFR BLD AUTO: 3 % (ref 0–6)
ERYTHROCYTE [DISTWIDTH] IN BLOOD BY AUTOMATED COUNT: 12.2 % (ref 11.6–15.1)
EST. AVERAGE GLUCOSE BLD GHB EST-MCNC: 131 MG/DL
FOLATE SERPL-MCNC: 4.5 NG/ML
GFR SERPL CREATININE-BSD FRML MDRD: 92 ML/MIN/1.73SQ M
GLUCOSE P FAST SERPL-MCNC: 106 MG/DL (ref 65–99)
GLUCOSE SERPL-MCNC: 106 MG/DL (ref 65–140)
HBA1C MFR BLD: 6.2 %
HCT VFR BLD AUTO: 36.9 % (ref 34.8–46.1)
HDLC SERPL-MCNC: 40 MG/DL
HGB BLD-MCNC: 12 G/DL (ref 11.5–15.4)
IMM GRANULOCYTES # BLD AUTO: 0.01 THOUSAND/UL (ref 0–0.2)
IMM GRANULOCYTES NFR BLD AUTO: 0 % (ref 0–2)
LDLC SERPL CALC-MCNC: 106 MG/DL (ref 0–100)
LYMPHOCYTES # BLD AUTO: 2.26 THOUSANDS/ÂΜL (ref 0.6–4.47)
LYMPHOCYTES NFR BLD AUTO: 39 % (ref 14–44)
MCH RBC QN AUTO: 31.1 PG (ref 26.8–34.3)
MCHC RBC AUTO-ENTMCNC: 32.5 G/DL (ref 31.4–37.4)
MCV RBC AUTO: 96 FL (ref 82–98)
MONOCYTES # BLD AUTO: 0.67 THOUSAND/ÂΜL (ref 0.17–1.22)
MONOCYTES NFR BLD AUTO: 12 % (ref 4–12)
NEUTROPHILS # BLD AUTO: 2.63 THOUSANDS/ÂΜL (ref 1.85–7.62)
NEUTS SEG NFR BLD AUTO: 45 % (ref 43–75)
NONHDLC SERPL-MCNC: 136 MG/DL
NRBC BLD AUTO-RTO: 0 /100 WBCS
PLATELET # BLD AUTO: 208 THOUSANDS/UL (ref 149–390)
PMV BLD AUTO: 9.2 FL (ref 8.9–12.7)
POTASSIUM SERPL-SCNC: 3.7 MMOL/L (ref 3.5–5.3)
PROT SERPL-MCNC: 5.4 G/DL (ref 6.4–8.4)
RBC # BLD AUTO: 3.86 MILLION/UL (ref 3.81–5.12)
SODIUM SERPL-SCNC: 139 MMOL/L (ref 135–147)
TREPONEMA PALLIDUM IGG+IGM AB [PRESENCE] IN SERUM OR PLASMA BY IMMUNOASSAY: NORMAL
TRIGL SERPL-MCNC: 148 MG/DL (ref ?–150)
VIT B12 SERPL-MCNC: 76 PG/ML (ref 180–914)
WBC # BLD AUTO: 5.79 THOUSAND/UL (ref 4.31–10.16)

## 2025-06-27 PROCEDURE — 86780 TREPONEMA PALLIDUM: CPT | Performed by: PSYCHIATRY & NEUROLOGY

## 2025-06-27 PROCEDURE — 82746 ASSAY OF FOLIC ACID SERUM: CPT | Performed by: PSYCHIATRY & NEUROLOGY

## 2025-06-27 PROCEDURE — 80053 COMPREHEN METABOLIC PANEL: CPT | Performed by: PSYCHIATRY & NEUROLOGY

## 2025-06-27 PROCEDURE — 99222 1ST HOSP IP/OBS MODERATE 55: CPT | Performed by: NURSE PRACTITIONER

## 2025-06-27 PROCEDURE — 97163 PT EVAL HIGH COMPLEX 45 MIN: CPT

## 2025-06-27 PROCEDURE — 82306 VITAMIN D 25 HYDROXY: CPT | Performed by: PSYCHIATRY & NEUROLOGY

## 2025-06-27 PROCEDURE — 80061 LIPID PANEL: CPT | Performed by: PSYCHIATRY & NEUROLOGY

## 2025-06-27 PROCEDURE — 99223 1ST HOSP IP/OBS HIGH 75: CPT | Performed by: PSYCHIATRY & NEUROLOGY

## 2025-06-27 PROCEDURE — 83036 HEMOGLOBIN GLYCOSYLATED A1C: CPT | Performed by: NURSE PRACTITIONER

## 2025-06-27 PROCEDURE — 85025 COMPLETE CBC W/AUTO DIFF WBC: CPT | Performed by: PSYCHIATRY & NEUROLOGY

## 2025-06-27 PROCEDURE — 82607 VITAMIN B-12: CPT | Performed by: PSYCHIATRY & NEUROLOGY

## 2025-06-27 RX ORDER — FOLIC ACID 1 MG/1
1 TABLET ORAL DAILY
Status: DISCONTINUED | OUTPATIENT
Start: 2025-06-28 | End: 2025-07-02 | Stop reason: HOSPADM

## 2025-06-27 RX ORDER — CYANOCOBALAMIN 1000 UG/ML
1000 INJECTION, SOLUTION INTRAMUSCULAR; SUBCUTANEOUS ONCE
Status: COMPLETED | OUTPATIENT
Start: 2025-06-28 | End: 2025-06-28

## 2025-06-27 RX ORDER — GABAPENTIN 100 MG/1
100 CAPSULE ORAL 3 TIMES DAILY
Status: DISCONTINUED | OUTPATIENT
Start: 2025-06-27 | End: 2025-06-27

## 2025-06-27 RX ORDER — METOPROLOL TARTRATE 25 MG/1
25 TABLET, FILM COATED ORAL EVERY 12 HOURS SCHEDULED
Status: DISCONTINUED | OUTPATIENT
Start: 2025-06-27 | End: 2025-07-02 | Stop reason: HOSPADM

## 2025-06-27 RX ORDER — ALBUTEROL SULFATE 90 UG/1
2 INHALANT RESPIRATORY (INHALATION) EVERY 4 HOURS PRN
Status: DISCONTINUED | OUTPATIENT
Start: 2025-06-27 | End: 2025-07-02 | Stop reason: HOSPADM

## 2025-06-27 RX ORDER — GABAPENTIN 100 MG/1
100 CAPSULE ORAL 3 TIMES DAILY
Status: DISCONTINUED | OUTPATIENT
Start: 2025-06-27 | End: 2025-07-02 | Stop reason: HOSPADM

## 2025-06-27 RX ORDER — LISINOPRIL 10 MG/1
10 TABLET ORAL DAILY
Status: DISCONTINUED | OUTPATIENT
Start: 2025-06-27 | End: 2025-07-02 | Stop reason: HOSPADM

## 2025-06-27 RX ORDER — PANTOPRAZOLE SODIUM 40 MG/1
40 TABLET, DELAYED RELEASE ORAL
Status: DISCONTINUED | OUTPATIENT
Start: 2025-06-27 | End: 2025-07-01

## 2025-06-27 RX ORDER — HYDROXYZINE HYDROCHLORIDE 25 MG/1
25 TABLET, FILM COATED ORAL
Status: DISCONTINUED | OUTPATIENT
Start: 2025-06-27 | End: 2025-07-02 | Stop reason: HOSPADM

## 2025-06-27 RX ORDER — ASPIRIN 81 MG/1
81 TABLET ORAL DAILY
Status: DISCONTINUED | OUTPATIENT
Start: 2025-06-27 | End: 2025-07-02 | Stop reason: HOSPADM

## 2025-06-27 RX ORDER — ERGOCALCIFEROL 1.25 MG/1
50000 CAPSULE, LIQUID FILLED ORAL WEEKLY
Status: DISCONTINUED | OUTPATIENT
Start: 2025-06-28 | End: 2025-07-02 | Stop reason: HOSPADM

## 2025-06-27 RX ADMIN — PANTOPRAZOLE SODIUM 40 MG: 40 TABLET, DELAYED RELEASE ORAL at 11:40

## 2025-06-27 RX ADMIN — GABAPENTIN 100 MG: 100 CAPSULE ORAL at 11:39

## 2025-06-27 RX ADMIN — LISINOPRIL 10 MG: 10 TABLET ORAL at 11:39

## 2025-06-27 RX ADMIN — METOPROLOL TARTRATE 25 MG: 25 TABLET, FILM COATED ORAL at 21:46

## 2025-06-27 RX ADMIN — METOPROLOL TARTRATE 25 MG: 25 TABLET, FILM COATED ORAL at 11:40

## 2025-06-27 RX ADMIN — SERTRALINE HYDROCHLORIDE 75 MG: 50 TABLET ORAL at 15:55

## 2025-06-27 RX ADMIN — ASPIRIN 81 MG: 81 TABLET, COATED ORAL at 11:39

## 2025-06-27 RX ADMIN — HYDROXYZINE HYDROCHLORIDE 25 MG: 25 TABLET, FILM COATED ORAL at 23:25

## 2025-06-27 RX ADMIN — GABAPENTIN 100 MG: 100 CAPSULE ORAL at 16:16

## 2025-06-27 NOTE — CASE MANAGEMENT
"Psychosocial Assessment 1:1:   CM met with patient to discuss admission and d/c planning. Patient inquired on signing herself out, CM explained 72 hour notice and process and to discuss with provider. Patient informed CM that she goes to [facility], lives at home with her youngest daughter Jihan, and reports her as a support as she takes her to doctor appointments. Patient informed CM that she is currently on zoloft and feels that it is working well despite patients admission. She reported that she was having a panic attack and that is why she signed herself in. Patient reports having appointments set up with [facility] for next Friday.      Admission / Details:   Patient is a 201 from [place] [facility]. She was brought in for fear of having a heart attack d/t her daughters boyfriend telling her if she continued to have panic attacks and anxiety she would drop dead from a cardiac event and \"would deserve it\".     County:      [place]  Commitment Status: 201  Insurance: Medicare A&B, blue cross  Rx coverage: [employer]  Marital Status:   Children: Jihan Willard (daughter) tel#[phone]  Family: Jihan Willard (daughter) and son in law.  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  Income/Source: Currently SSI  Scientology: None  Transportation: Jihan takes patient  Legal Issues: Denies  Pharmacy:  [facility]  MH Treatment Hx: No previous IP treatment  Trauma Hx: Denies  Family Hx: Reports mother had depression  D&A Hx: Denies  Medical: See H&P   DME: Cane at home  Tobacco: Never smoked  [employer] Hx: Denies  Access to firearms: Denies  UDS Results: Normal  PCP:   Psych: [facility] tel#[phone]  Therapist: [facility]  ICM/ACT:  Denies  Community Supports: Family  Stressors: " Abandonment   Strengths:  Here willingly  Coping Skills: Unknown  ROIs Signed: Jihan Vargass (daughter) tel#[phone],  [facility] tel#[phone]  Treatment Plan Signed: Reviewed  IMM Signed: yes

## 2025-06-27 NOTE — EMTALA/ACUTE CARE TRANSFER
[facility] EMERGENCY DEPARTMENT  [address] [address]  Dept: [phone]      EMTALA TRANSFER CONSENT    NAME Juanita Willard                                         1955                              MRN [id number]    I have been informed of my rights regarding examination, treatment, and transfer   by Dr. Jena Major DO    Benefits: Specialized equipment and/or services available at the receiving facility (Include comment)________________________    Risks: Potential for delay in receiving treatment      Consent for Transfer:  I acknowledge that my medical condition has been evaluated and explained to me by the emergency department physician or other qualified medical person and/or my attending physician, who has recommended that I be transferred to the service of  Accepting Physician: Dr Soria at Accepting Facility Name, City & State : [place]. The above potential benefits of such transfer, the potential risks associated with such transfer, and the probable risks of not being transferred have been explained to me, and I fully understand them.  The doctor has explained that, in my case, the benefits of transfer outweigh the risks.  I agree to be transferred.    I authorize the performance of emergency medical procedures and treatments upon me in both transit and upon arrival at the receiving facility.  Additionally, I authorize the release of any and all medical records to the receiving facility and request they be transported with me, if possible.  I understand that the safest mode of transportation during a medical emergency is an ambulance and that the Hospital advocates the use of this mode of transport. Risks of traveling to the receiving facility by car, including absence of medical control, life sustaining equipment, such as oxygen, and medical personnel has been explained to me and I fully understand them.    (WEN CORRECT BOX BELOW)  [  ]  I consent to the  stated transfer and to be transported by ambulance/helicopter.  [  ]  I consent to the stated transfer, but refuse transportation by ambulance and accept full responsibility for my transportation by car.  I understand the risks of non-ambulance transfers and I exonerate the Hospital and its staff from any deterioration in my condition that results from this refusal.    X___________________________________________    DATE  25  TIME________  Signature of patient or legally responsible individual signing on patient behalf           RELATIONSHIP TO PATIENT_________________________          Provider Certification    NAME Juanita Willard                                         1955                              MRN [id number]    A medical screening exam was performed on the above named patient.  Based on the examination:    Condition Necessitating Transfer The primary encounter diagnosis was Anxiety. A diagnosis of Depression was also pertinent to this visit.    Patient Condition: The patient has been stabilized such that within reasonable medical probability, no material deterioration of the patient condition or the condition of the unborn child(mahnaz) is likely to result from the transfer    Reason for Transfer: Level of Care needed not available at this facility    Transfer Requirements: Facility [facility]   Space available and qualified personnel available for treatment as acknowledged by    Agreed to accept transfer and to provide appropriate medical treatment as acknowledged by       Dr Soria  Appropriate medical records of the examination and treatment of the patient are provided at the time of transfer   STAFF INITIAL WHEN COMPLETED _______  Transfer will be performed by qualified personnel from    and appropriate transfer equipment as required, including the use of necessary and appropriate life support measures.    Provider Certification: I have examined the patient and explained the following  risks and benefits of being transferred/refusing transfer to the patient/family:  General risk, such as traffic hazards, adverse weather conditions, rough terrain or turbulence, possible failure of equipment (including vehicle or aircraft), or consequences of actions of persons outside the control of the transport personnel      Based on these reasonable risks and benefits to the patient and/or the unborn child(mahnaz), and based upon the information available at the time of the patient’s examination, I certify that the medical benefits reasonably to be expected from the provision of appropriate medical treatments at another medical facility outweigh the increasing risks, if any, to the individual’s medical condition, and in the case of labor to the unborn child, from effecting the transfer.    X____________________________________________ DATE 06/26/25        TIME_______      ORIGINAL - SEND TO MEDICAL RECORDS   COPY - SEND WITH PATIENT DURING TRANSFER

## 2025-06-27 NOTE — ASSESSMENT & PLAN NOTE
Vital signs stable afebrile patient seen and examined by myself at the bedside labs from today reviewed by myself sodium 139 potassium 3.7 creatinine 0.62  Patient medically stable for admit  Please reach out to [facility] with any medical questions or concerns

## 2025-06-27 NOTE — TREATMENT TEAM
Pt attends groups.  Pt anxious and cooperative  Pt  displayed positive thinking pattens.   Pt able to identify strengths.      06/27/25 1100 06/27/25 1330   Activity/Group Checklist   Group Music Self Esteem   Attendance Did not attend Attended   Attendance Duration (min)  --  46-60   Interactions  --  Interacted appropriately   Affect/Mood  --  Other (Comment)  (anxious)   Goals Achieved  --  Identified feelings;Identified triggers;Identified relapse prevention strategies;Discussed coping strategies;Able to listen to others;Able to engage in interactions;Able to reflect/comment on own behavior;Able to manage/cope with feelings;Verbalized increased hopefulness

## 2025-06-27 NOTE — ED NOTES
Patient being transferred to [facility] inpatient [facility] facility. Patient's belongings given to [facility]. Patient states she had a  that she had previously. No  found in belongings or charted.   Patient's daughter called about transfer. Daughter did not answer but message left on voicemail.     Coleman Casey RN  06/26/25 7935

## 2025-06-27 NOTE — ED NOTES
Patient is accepted at [facility]   Patient is accepted by Dr. Rosaura Donis in Intake      Transportation is arranged with [facility].     Transportation is scheduled for 9 pm by SDM   Patient may go to the floor at anytime after 9pm         Nurse report is to be called to [phone] prior to patient transfer.           Eloisa TINSLEY

## 2025-06-27 NOTE — PROGRESS NOTES
06/27/25 1556   Team Meeting   Meeting Type Tx Team Meeting   Initial Conference Date 06/27/25   Next Conference Date 07/27/25   Team Members Present   Team Members Present Physician;Nurse;   Physician Team Member Dr. Mirza   Nursing Team Member Carole SERNA   Care Management Team Member Kadie BURROUGHS   Patient/Family Present   Patient Present Yes   Patient's Family Present No     Treatment plan and goals reviewed with patient. Patient in agreement and signed.

## 2025-06-27 NOTE — NURSING NOTE
"Patient is anxious and fearful. Needed reassurance multiple times r/t metoprolol and atarax 25mg being administered at bedtime. Patient reports \"I don't want my blood pressure to drop too much and then I die. I read on the internet a side effect of atrax is blood pressure dropping but not enough people had it so it never made it on the official list.\" Patient discussed multiple other somatic fears.  "

## 2025-06-27 NOTE — TREATMENT PLAN
TREATMENT PLAN REVIEW - Behavioral Health Juanita Willard 70 y.o. 1955 female MRN: [id number]    [facility] 6B [facility] Room / Bed: [facility] Encounter: [id number]          Admit Date/Time:  6/26/2025 10:04 PM    Treatment Team:   DO Lydia Marroquin CRNP Destiny Bracero Joselyn Olivo Natividad Rios Briana Faulstick Ashley O'Hara, RN Scarlet Abreu Melissa Hawkey, NICHO Gomez    Diagnosis: Active Problems:    Medical clearance for psychiatric admission    Morbid obesity (HCC)    Vitamin D deficiency    B12 deficiency    Folate deficiency    Hypertension    GERD without esophagitis      Patient Strengths/Assets: ability for insight, average or above intelligence, capable of independent living, cooperative, compliant with medication, motivation for treatment/growth, negotiates basic needs, patient is on a voluntary commitment, sense of humor, special hobby/interest, well educated    Patient Barriers/Limitations: chronic pain, family conflict, self-care deficit, unresourceful    Short Term Goals: decrease in depressive symptoms, decrease in anxiety symptoms, improvement in insight, improvement in self care, sleep improvement, improvement in appetite    Long Term Goals: improvement in depression, improvement in anxiety, resolution of depressive symptoms, adequate self care, adequate sleep, adequate appetite, appropriate interaction with family, stable living arrangements upon discharge    Progress Towards Goals: starting psychiatric medications as prescribed    Recommended Treatment: medication management, patient medication education, group therapy, milieu therapy, continued Behavioral Health psychiatric evaluation/assessment process    Treatment Frequency: daily medication monitoring, group and milieu therapy daily, monitoring through interdisciplinary rounds, monitoring through weekly patient care conferences    Expected Discharge Date:   TBD    Discharge Plan: referrals as indicated    Treatment Plan Created/Updated By: Rupal Mirza DO

## 2025-06-27 NOTE — PROGRESS NOTES
06/27/25 1025   Admission   Release of Information Signed Yes  (Jihan Willard (daughter) tel#[phone], [facility] tel#[phone])

## 2025-06-27 NOTE — CONSULTS
Consultation - Hospitalist   Name: Juanita Willard 70 y.o. female I MRN: [id number]  Unit/Bed#: [facility] 642-02 I Date of Admission: 6/26/2025   Date of Service: 6/27/2025 I Hospital Day: 1   Inpatient consult for Medical Clearance for  patient  Consult performed by: JUNIOR Day  Consult ordered by: Carlotta Soria MD        Physician Requesting Evaluation: Rupal Mirza DO   Reason for Evaluation / Principal Problem: Clearance for psychiatric admission    Assessment & Plan  Medical clearance for psychiatric admission  Vital signs stable afebrile patient seen and examined by myself at the bedside labs from today reviewed by myself sodium 139 potassium 3.7 creatinine 0.62  Patient medically stable for admit  Please reach out to  IM with any medical questions or concerns  Morbid obesity (HCC)  BMI 41.51  Lifestyle modifications  Vitamin D deficiency  Patient's vitamin D level less than 7.0's  Start ergocalciferol 50,000 units p.o. weekly x 8 weeks  Patient needs a repeat vitamin D level drawn in 10 to 12 weeks with her PCP  B12 deficiency  Patient's vitamin B12 level 76  Tomorrow patient will receive a one-time dose of IM vitamin B12 x 1  On Sunday, 6/29/2025 patient was started on oral vitamin B supplementation daily  She needs a repeat vitamin B12 level drawn with her PCP in 10 to 12 weeks  Folate deficiency  Patient's folate level 4.5  Start folic acid 1 mg daily  She needs a repeat folate level drawn with her PCP in 10 to 12 weeks  Hypertension  Will continue on her home dose of lisinopril, aspirin and metoprolol  We will continue to monitor her blood pressure and ongoing basis and additively agents as necessary  GERD without esophagitis  Patient will continue on her home dose of her PPI        Collaboration of Care: Were Recommendations Directly Discussed with Primary Treatment Team? - No     History of Present Illness   Juanita Willard is a 70 y.o. female who is originally admitted to the  psychiatry service due to increasing anxiety and depression. We are consulted for medical clearance for admission to Behavioral Health Unit and treatment of underlying psychiatric illness.      Patient presents to [facility] ER on 6/25/2025 with increased anxiety and depression.  Patient is having increased anxiety thinking that she is going to have a heart attack.  She has a past medical history of hypertension, GERD without esophagitis as well as morbid obesity and she has MDD and anxiety    Review of Systems   Constitutional:  Negative for chills and fever.   HENT:  Negative for ear pain and sore throat.    Eyes:  Negative for pain and visual disturbance.   Respiratory:  Negative for cough and shortness of breath.    Cardiovascular:  Negative for chest pain and palpitations.   Gastrointestinal:  Negative for abdominal pain and vomiting.   Genitourinary:  Negative for dysuria and hematuria.   Musculoskeletal:  Negative for arthralgias and back pain.   Skin:  Negative for color change and rash.   Neurological:  Negative for seizures and syncope.   Psychiatric/Behavioral:  Positive for decreased concentration and dysphoric mood.    All other systems reviewed and are negative.      Historical Information   Past Medical History[1]  Past Surgical History[2]  Social History[3]  E-Cigarette/Vaping    E-Cigarette Use Never User      E-Cigarette/Vaping Substances       Marital Status:     Meds/Allergies   I have reviewed home medications with patient personally.  Prior to Admission medications    Medication Sig Start Date End Date Taking? Authorizing Provider   aspirin 81 mg chewable tablet Chew 81 mg in the morning.   Yes Historical Provider, MD   esomeprazole (NexIUM) 20 mg capsule Take 20 mg by mouth daily in the early morning 6/21/25  Yes Historical Provider, MD   gabapentin (NEURONTIN) 100 MG tablet Take 100 mg by mouth 3 (three) times a day 6/20/25 7/20/25 Yes Historical Provider, MD   hydrOXYzine HCL  "(ATARAX) 25 mg tablet Take 1 tablet (25 mg total) by mouth every 6 (six) hours  Patient taking differently: Take 25 mg by mouth every 6 (six) hours as needed for anxiety 5/29/25  Yes Alberto Smalls MD   lisinopril (ZESTRIL) 10 mg tablet Take 10 mg by mouth in the morning.   Yes Historical Provider, MD   metoprolol tartrate (LOPRESSOR) 25 mg tablet Take 25 mg by mouth every 12 (twelve) hours   Yes Historical Provider, MD   sertraline (ZOLOFT) 50 mg tablet Take 50 mg by mouth daily 6/20/25 7/20/25 Yes Historical Provider, MD   albuterol (PROVENTIL HFA,VENTOLIN HFA) 90 mcg/act inhaler Inhale 2 puffs every 6 (six) hours as needed for wheezing    Historical Provider, MD   polyethylene glycol (MIRALAX) 17 g packet Take 17 g by mouth daily for 7 days 9/3/23 9/10/23  Edyta Viveros, DO     Allergies   Allergen Reactions    Orange Fruit [Citrus - Food Allergy] Hives    Sulfa Antibiotics Hives    Azithromycin Other (See Comments)     Pain in legs    Celebrex [Celecoxib] Vomiting    Penicillins Angioedema    Percolone [Oxycodone] Chest Pain       Objective :  Temp:  [96.9 °F (36.1 °C)-97.8 °F (36.6 °C)] 96.9 °F (36.1 °C)  HR:  [71-96] 96  BP: (109-185)/(55-81) 185/81  Resp:  [17-19] 17  SpO2:  [94 %-97 %] 94 %  O2 Device: None (Room air)    Height: 5' 8\" (172.7 cm) (06/26/25 2332)  Weight - Scale: 124 kg (273 lb) (06/26/25 2332)  Physical Exam  Constitutional:       Appearance: Normal appearance. She is obese.   HENT:      Head: Normocephalic and atraumatic.      Nose: Nose normal.      Mouth/Throat:      Mouth: Mucous membranes are moist.      Pharynx: Oropharynx is clear.     Eyes:      Extraocular Movements: Extraocular movements intact.      Pupils: Pupils are equal, round, and reactive to light.       Cardiovascular:      Rate and Rhythm: Normal rate and regular rhythm.      Pulses: Normal pulses.      Heart sounds: Normal heart sounds.   Pulmonary:      Effort: Pulmonary effort is normal.      Breath sounds: Normal " "breath sounds.   Abdominal:      General: Abdomen is flat. Bowel sounds are normal.      Palpations: Abdomen is soft.     Musculoskeletal:         General: Normal range of motion.      Cervical back: Normal range of motion and neck supple.     Skin:     General: Skin is warm and dry.     Neurological:      Mental Status: She is alert and oriented to person, place, and time.     Psychiatric:         Attention and Perception: Attention normal.         Mood and Affect: Mood is depressed.         Speech: Speech normal.         Behavior: Behavior normal. Behavior is cooperative.         Thought Content: Thought content normal.         Cognition and Memory: Cognition and memory normal.         Judgment: Judgment normal.           Lab Results: I have reviewed the following results:  Results from last 7 days   Lab Units 06/27/25  0500   WBC Thousand/uL 5.79   HEMOGLOBIN g/dL 12.0   HEMATOCRIT % 36.9   PLATELETS Thousands/uL 208   SEGS PCT % 45   LYMPHO PCT % 39   MONO PCT % 12   EOS PCT % 3     Results from last 7 days   Lab Units 06/27/25  0500   SODIUM mmol/L 139   POTASSIUM mmol/L 3.7   CHLORIDE mmol/L 106   CO2 mmol/L 28   BUN mg/dL 11   CREATININE mg/dL 0.62   ANION GAP mmol/L 5   CALCIUM mg/dL 8.9   ALBUMIN g/dL 3.5   TOTAL BILIRUBIN mg/dL 0.53   ALK PHOS U/L 37   ALT U/L 12   AST U/L 13   GLUCOSE RANDOM mg/dL 106             No results found for: \"HGBA1C\"        Imaging Results Review: No pertinent imaging studies reviewed.  Other Study Results Review: EKG was reviewed.   6/25/2025 twelve-lead EKG reviewed by myself as well as interpreted by myself ventricular rate 78 QT interval 402 QTc 434 normal sinus rhythm normal EKG when compared with an EKG from 5/29/2025 there are no new acute EKG findings noted in this EKG.  Administrative Statements   I have spent a total time of 45 minutes in caring for this patient on the day of the visit/encounter including Diagnostic results, Prognosis, Risks and benefits of tx options, " Instructions for management, Patient and family education, Importance of tx compliance, Risk factor reductions, Impressions, Counseling / Coordination of care, Documenting in the medical record, Reviewing/placing orders in the medical record (including tests, medications, and/or procedures), Obtaining or reviewing history  , and Communicating with other healthcare professionals .  ** Please Note: This note has been constructed using a voice recognition system. **       [1]   Past Medical History:  Diagnosis Date    Anxiety     Arthritis     Depression     Hypertension     Mitral prolapse     Panic attack    [2]   Past Surgical History:  Procedure Laterality Date    HERNIA REPAIR     [3]   Social History  Tobacco Use    Smoking status: Never    Smokeless tobacco: Never   Vaping Use    Vaping status: Never Used   Substance and Sexual Activity    Alcohol use: Never    Drug use: Never    Sexual activity: Not Currently

## 2025-06-27 NOTE — NURSING NOTE
Patient is pleasant and cooperative. Withdrawn to bedroom. Appetite is adequate. Showered this shift. Medication compliant. Attended one group. Appears anxious. Continuous rounding maintained.

## 2025-06-27 NOTE — NURSING NOTE
"Patient is a 89 from [facility]. She was brought in for fear of having a heart attack d/t her daughters boyfriend telling her if she continued to have panic attacks and anxiety she would drop dead from a cardiac event and \"would deserve it\". Patient was reassured about the unlikelihood of anxiety causing a spontaneous cardiac event. She attempted to use coping mechanism before going to the ED, but was unsuccessful. She is pleasant, cooperative, fearful, and rambling in speech. She utilized PRN Atarax 50 mg on this shift, and declined scheduled melatonin 3 mg stating it would be \"to much\". Skin assessment was unremarkable. She denies SI, HI, and A/V hallucinations. Endorses worsening anxiety and depression. She is able to make her needs known and was acclimated to the unit. Walker was provided in place of her personal cane. Safety checks initiated.   "
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
## 2025-06-27 NOTE — PHYSICAL THERAPY NOTE
"Physical Therapy Evaluation    Patient's Name: Juanita Willard    Admitting Diagnosis  Major depressive disorder, single episode, unspecified [F32.9]    Problem List  Problem List[1]    Past Medical History  Past Medical History[2]    Past Surgical History  Past Surgical History[3]    Recent Imaging  No orders to display       Recent Vital Signs  Vitals:    06/26/25 2100 06/26/25 2332 06/27/25 0823 06/27/25 1138   BP: 141/70 141/70 160/77 (!) 185/81   BP Location: Left arm Left arm Right arm Left arm   Pulse: 72 72 71 96   Resp: 18 18 17    Temp: (!) 97.2 °F (36.2 °C) (!) 97.2 °F (36.2 °C) (!) 96.9 °F (36.1 °C)    TempSrc: Temporal Temporal Temporal    SpO2: 96%  94%    Weight: 124 kg (273 lb) 124 kg (273 lb)     Height: 5' 8\" (1.727 m) 5' 8\" (1.727 m)          06/27/25 1046   PT Last Visit   PT Visit Date 06/27/25   Note Type   Note type Evaluation   Pain Assessment   Pain Assessment Tool 0-10   Pain Score No Pain   Restrictions/Precautions   Weight Bearing Precautions Per Order No   Home Living   Type of Home House   Home Layout Two level   Bathroom Shower/Tub Tub/shower unit   Bathroom Accessibility Accessible via walker   Prior Function   Level of [place] Independent with ADLs;Independent with functional mobility   Lives With Daughter   Receives Help From Family   Falls in the last 6 months 0   General   Family/Caregiver Present No   Cognition   Overall Cognitive Status WFL   Arousal/Participation Alert   Orientation Level Oriented X4   Memory Within functional limits   Following Commands Follows all commands and directions without difficulty   RLE Assessment   RLE Assessment   (4/5)   LLE Assessment   LLE Assessment   (4/5)   Coordination   Movements are Fluid and Coordinated 1   Sensation X   Light Touch   RLE Light Touch Impaired   LLE Light Touch Impaired   Bed Mobility   Supine to Sit 6  Modified independent   Additional items Increased time required   Sit to Supine 6  Modified independent   Additional " items Increased time required   Transfers   Sit to Stand 6  Modified independent   Additional items Increased time required   Stand to Sit 6  Modified independent   Additional items Increased time required   Ambulation/Elevation   Gait pattern Step through pattern;Decreased toe off;Decreased heel strike;Excessively slow;Short stride;Decreased foot clearance   Gait Assistance 6  Modified independent   Additional items Verbal cues   Assistive Device Rolling walker   Distance 120ft, 100ft   Balance   Static Sitting Fair +   Dynamic Sitting Fair +   Static Standing Fair   Dynamic Standing Fair   Ambulatory Fair   Endurance Deficit   Endurance Deficit Yes   Endurance Deficit Description reduced from baseline   Activity Tolerance   Activity Tolerance Patient tolerated treatment well   Medical Staff Made Aware spoke to CM   Nurse Made Aware spoke to RN   Assessment   Prognosis Good   Problem List Decreased strength;Decreased range of motion;Decreased endurance;Impaired balance;Decreased mobility;Decreased coordination;Decreased skin integrity   Barriers to Discharge Inaccessible home environment;Decreased caregiver support   Goals   Patient Goals to try a knee brace for knee instability   STG Expiration Date 07/07/25   Short Term Goal #1 see eval note   PT Treatment Day 0   Plan   Treatment/Interventions ADL retraining;Functional transfer training;LE strengthening/ROM;Elevations;Therapeutic exercise;Endurance training;Patient/family training;Equipment eval/education;Gait training;Bed mobility;Spoke to nursing;Spoke to case management;OT   PT Frequency 1-2x/wk   Discharge Recommendation   Rehab Resource Intensity Level, PT III (Minimum Resource Intensity)  (may benefit from OP PT)   Equipment Recommended Walker   Walker Package Recommended Wheeled walker   AM-PAC Basic Mobility Inpatient   Turning in Flat Bed Without Bedrails 4   Lying on Back to Sitting on Edge of Flat Bed Without Bedrails 4   Moving Bed to Chair 4    Standing Up From Chair Using Arms 4   Walk in Room 4   Climb 3-5 Stairs With Railing 4   Basic Mobility Inpatient Raw Score 24   Basic Mobility Standardized Score 57.68   [facility] Highest Level Of Mobility   -HL Goal 8: Walk 250 feet or more   -[facility] Achieved 8: Walk 250 feet ot more   End of Consult   Patient Position at End of Consult All needs within reach;Seated edge of bed       ASSESSMENT                                                                                                                     Juanita Willard is a 70 y.o. female admitted to [facility] on 6/26/2025 for <principal problem not specified>. Pt  has a past medical history of Anxiety, Arthritis, Depression, Hypertension, Mitral prolapse, and Panic attack.. PT was consulted and pt was seen on 6/27/2025 for mobility assessment and d/c planning.  Impairments limiting pt at this time include decreased ROM, impaired balance, decreased endurance, decreased sensation, and decreased strength. Pt is currently functioning at a modified independent assistance level for bed mobility, modified independent assistance level for transfers, modified independent assistance level for ambulation with Rolling Walker. The patient's AM-PAC Basic Mobility Inpatient Short Form Raw Score is 24. A Raw score of greater than 16 suggests the patient may benefit from discharge to home. Please also refer to the recommendation of the Physical Therapist for safe discharge planning.    Goals                                                                                                                                    1) Bed mobility skills with independent to facilitate safe return to previous living environment 2) Functional transfers with independent to facilitate safe return to previous living environment  3) Ambulation with least restrictive AD independent without LOB and stable vitals for safe ambulation home/ community distances. 4) Stair training up/down flight 12  step/s with appropriate rail/s  and independent for safe access to previous living environment. 5) Improve balance grades to fair + to reduce risk of falls. 6)Improve LE strength grades by 1 to increase independence w/ transfers and gait.  7) PT for ongoing pt and family education; DME needs and D/C planning to promote highest level of function in least restrictive environment.     Recommendations                                                                                                              Pt will benefit from continued skilled IP PT to address the above mentioned impairments in order to maximize recovery and increase functional independence when completing mobility and ADLs. See flow sheet for goals and POC.     DME: Rolling Walker    Discharge Disposition:  Home with Outpatient Physical Therapy       Sanjeev Bowles PT, DPT         [1]   Patient Active Problem List  Diagnosis    Medical clearance for psychiatric admission    Morbid obesity (HCC)    Vitamin D deficiency    B12 deficiency    Folate deficiency    Hypertension    GERD without esophagitis   [2]   Past Medical History:  Diagnosis Date    Anxiety     Arthritis     Depression     Hypertension     Mitral prolapse     Panic attack    [3]   Past Surgical History:  Procedure Laterality Date    HERNIA REPAIR

## 2025-06-27 NOTE — ASSESSMENT & PLAN NOTE
70-year-old female with past psychiatric history of MDD and SHASTA who presents with worsening anxiety and depression.  She states that several stressors have occurred involving her daughter telling her that she would move out of the house to [place].  Juanita expresses that she has never lived on her own, and she is unable to live with her other 2 children.  This has led to patient requiring more anxiety medication, which patient believes has caused blood pressure fluctuations leading to her presenting to the ED fearful that she was having a heart attack.  She was started on Zoloft several weeks ago but was only prescribed 50 mg, she was also receiving gabapentin 100 mg 3 times daily, and hydroxyzine 25 mg 3 times daily.     Restart Zoloft and increase to 75 mg daily -plan to continue to optimize based on symptom improvement and patient tolerability  Restart gabapentin 100 mg 3 times daily -plan to continue to optimize based on symptom improvement and patient's tolerability  Restart hydroxyzine 25 mg at bedtime, patient reports this has helped with insomnia

## 2025-06-27 NOTE — ASSESSMENT & PLAN NOTE
Patient's vitamin D level less than 7.0's  Start ergocalciferol 50,000 units p.o. weekly x 8 weeks  Patient needs a repeat vitamin D level drawn in 10 to 12 weeks with her PCP

## 2025-06-27 NOTE — CASE MANAGEMENT
CM placed call Jihan tel#[phone] to notify of admission and provide CM direct number and to ask concerns. Jihan reports that patient lives with her and her main concern is where patient's anxiety stemmed from as it came out of no where. Patient is very dependent on Jihan. CM discussed the information regarding someone moving to [facility], Jihan reported that she would [place] of [place] to the [place] of living in [place]. Jihan reported that patient is a home body and started to panic when she discussed wanting to move, though Jihan would not leave patient behind if this were to occur.  Patient has disclosed to Jihan that patient is scared to leave the area. Visit scheduled for Sunday at 10am with daughter. Jihan reported that she is able to pick patient up for discharge. Discussed PHP potentially after discharge.

## 2025-06-27 NOTE — PROGRESS NOTES
"   06/27/25 0900   Team Meeting   Meeting Type Daily Rounds   Team Members Present   Team Members Present Physician;Nurse;;   Physician Team Member Dr. Sweneey / Dr. Estrella / Dr. Mirza / TIERRA Bonilla   Nursing Team Member Barry/Candie   Care Management Team Member Kelsie   Social Work Team Member Venancio   Patient/Family Present   Patient Present No   Patient's Family Present No     Patient is a [facility] from [facility]. She was brought in for fear of having a heart attack d/t her daughters boyfriend telling her if she continued to have panic attacks and anxiety she would drop dead from a cardiac event and \"would deserve it\". Patient was given PRN atarax 25 and zoloft, no prior IP admissions, uses a cane at home though has rolling walker on the unit, calm and cooperative on the unit. Patient reports not wanting to live alone and family is moving to [place]. Patient can be hyper verbal. Patient discharge is pending stabilization of mood and medications.     "

## 2025-06-27 NOTE — CASE MANAGEMENT
CM return Jihan's call (daughter) regarding items that patient is allowed to have on the unit. CM informed Jihan of appropriate clothing for the unit.

## 2025-06-27 NOTE — ASSESSMENT & PLAN NOTE
Patient's vitamin B12 level 76  Tomorrow patient will receive a one-time dose of IM vitamin B12 x 1  On Sunday, 6/29/2025 patient was started on oral vitamin B supplementation daily  She needs a repeat vitamin B12 level drawn with her PCP in 10 to 12 weeks

## 2025-06-27 NOTE — PLAN OF CARE
Problem: PHYSICAL THERAPY ADULT  Goal: Performs mobility at highest level of function for planned discharge setting.  See evaluation for individualized goals.  Description: Treatment/Interventions: ADL retraining, Functional transfer training, LE strengthening/ROM, Elevations, Therapeutic exercise, Endurance training, Patient/family training, Equipment eval/education, Gait training, Bed mobility, Spoke to nursing, Spoke to case management, OT  Equipment Recommended: Walker       See flowsheet documentation for full assessment, interventions and recommendations.  Outcome: Progressing  Note: Prognosis: Good  Problem List: Decreased strength, Decreased range of motion, Decreased endurance, Impaired balance, Decreased mobility, Decreased coordination, Decreased skin integrity     Barriers to Discharge: Inaccessible home environment, Decreased caregiver support     Rehab Resource Intensity Level, PT: III (Minimum Resource Intensity) (may benefit from OP PT)    See flowsheet documentation for full assessment.

## 2025-06-27 NOTE — ASSESSMENT & PLAN NOTE
Will continue on her home dose of lisinopril, aspirin and metoprolol  We will continue to monitor her blood pressure and ongoing basis and additively agents as necessary

## 2025-06-27 NOTE — TREATMENT TEAM
06/26/25 2252   Provider Notification   Reason for Communication Admission   Provider Name Sole Soria MD   Provider Role Other (Comment)  ([place])   Method of Communication Other (Comment)  (Secure chat)   Response No new orders   Notification Time 2252     Provider notified of PTA med list completed with patient

## 2025-06-27 NOTE — ASSESSMENT & PLAN NOTE
Patient's folate level 4.5  Start folic acid 1 mg daily  She needs a repeat folate level drawn with her PCP in 10 to 12 weeks

## 2025-06-27 NOTE — PLAN OF CARE
Problem: Ineffective Coping  Goal: Cooperates with admission process  Description: Interventions:   - Complete admission process  Outcome: Progressing  Goal: Identifies ineffective coping skills  Outcome: Progressing  Goal: Identifies healthy coping skills  Outcome: Progressing  Goal: Demonstrates healthy coping skills  Outcome: Progressing  Goal: Participates in unit activities  Description: Interventions:  - Provide therapeutic environment   - Provide required programming   - Redirect inappropriate behaviors   Outcome: Progressing  Goal: Patient/Family participate in treatment and DC plans  Description: Interventions:  - Provide therapeutic environment  Outcome: Progressing  Goal: Patient/Family verbalizes awareness of resources  Outcome: Progressing  Goal: Understands least restrictive measures  Description: Interventions:  - Utilize least restrictive behavior  Outcome: Progressing  Goal: Free from restraint events  Description: - Utilize least restrictive measures   - Provide behavioral interventions   - Redirect inappropriate behaviors   Outcome: Progressing     Problem: Anxiety  Goal: Anxiety is at manageable level  Description: Interventions:  - Assess and monitor patient's anxiety level.   - Monitor for signs and symptoms (heart palpitations, chest pain, shortness of breath, headaches, nausea, feeling jumpy, restlessness, irritable, apprehensive).   - Collaborate with interdisciplinary team and initiate plan and interventions as ordered.  - [place] patient to unit/surroundings  - Explain treatment plan  - Encourage participation in care  - Encourage verbalization of concerns/fears  - Identify coping mechanisms  - Assist in developing anxiety-reducing skills  - Administer/offer alternative therapies  - Limit or eliminate stimulants  Outcome: Progressing

## 2025-06-28 PROCEDURE — 99232 SBSQ HOSP IP/OBS MODERATE 35: CPT | Performed by: STUDENT IN AN ORGANIZED HEALTH CARE EDUCATION/TRAINING PROGRAM

## 2025-06-28 RX ADMIN — LISINOPRIL 10 MG: 10 TABLET ORAL at 09:09

## 2025-06-28 RX ADMIN — ASPIRIN 81 MG: 81 TABLET, COATED ORAL at 09:09

## 2025-06-28 RX ADMIN — METFORMIN HYDROCHLORIDE 500 MG: 500 TABLET, FILM COATED ORAL at 09:09

## 2025-06-28 RX ADMIN — SERTRALINE HYDROCHLORIDE 75 MG: 50 TABLET ORAL at 09:09

## 2025-06-28 RX ADMIN — CYANOCOBALAMIN 1000 MCG: 1000 INJECTION INTRAMUSCULAR; SUBCUTANEOUS at 09:12

## 2025-06-28 RX ADMIN — METOPROLOL TARTRATE 25 MG: 25 TABLET, FILM COATED ORAL at 21:23

## 2025-06-28 RX ADMIN — METFORMIN HYDROCHLORIDE 500 MG: 500 TABLET, FILM COATED ORAL at 16:54

## 2025-06-28 RX ADMIN — METOPROLOL TARTRATE 25 MG: 25 TABLET, FILM COATED ORAL at 09:10

## 2025-06-28 RX ADMIN — GABAPENTIN 100 MG: 100 CAPSULE ORAL at 09:09

## 2025-06-28 RX ADMIN — FOLIC ACID 1 MG: 1 TABLET ORAL at 09:09

## 2025-06-28 RX ADMIN — ERGOCALCIFEROL 50000 UNITS: 1.25 CAPSULE ORAL at 09:10

## 2025-06-28 RX ADMIN — GABAPENTIN 100 MG: 100 CAPSULE ORAL at 20:07

## 2025-06-28 RX ADMIN — HYDROXYZINE HYDROCHLORIDE 25 MG: 25 TABLET, FILM COATED ORAL at 21:23

## 2025-06-28 RX ADMIN — GABAPENTIN 100 MG: 100 CAPSULE ORAL at 13:56

## 2025-06-28 RX ADMIN — PANTOPRAZOLE SODIUM 40 MG: 40 TABLET, DELAYED RELEASE ORAL at 05:33

## 2025-06-28 NOTE — PLAN OF CARE
Problem: Anxiety  Goal: Anxiety is at manageable level  Description: Interventions:  - Assess and monitor patient's anxiety level.   - Monitor for signs and symptoms (heart palpitations, chest pain, shortness of breath, headaches, nausea, feeling jumpy, restlessness, irritable, apprehensive).   - Collaborate with interdisciplinary team and initiate plan and interventions as ordered.  - [place] patient to unit/surroundings  - Explain treatment plan  - Encourage participation in care  - Encourage verbalization of concerns/fears  - Identify coping mechanisms  - Assist in developing anxiety-reducing skills  - Administer/offer alternative therapies  - Limit or eliminate stimulants  Outcome: Progressing     Problem: Alteration in Thoughts and Perception  Goal: Verbalize thoughts and feelings  Description: Interventions:  - Promote a nonjudgmental and trusting relationship with the patient through active listening and therapeutic communication  - Assess patient's level of functioning, behavior and potential for risk  - Engage patient in 1 on 1 interactions  - Encourage patient to express fears, feelings, frustrations, and discuss symptoms    - [place] patient to reality, help patient recognize reality-based thinking   - Administer medications as ordered and assess for potential side effects  - Provide the patient education related to the signs and symptoms of the illness and desired effects of prescribed medications  Outcome: Progressing  Goal: Agree to be compliant with medication regime, as prescribed and report medication side effects  Description: Interventions:  - Offer appropriate PRN medication and supervise ingestion; conduct AIMS, as needed   Outcome: Progressing  Goal: Attend and participate in unit activities, including therapeutic, recreational, and educational groups  Description: Interventions:  -Encourage Visitation and family involvement in care  Outcome: Progressing     Problem: Risk for Self  Injury/Neglect  Goal: Verbalize thoughts and feelings  Description: Interventions:  - Assess and re-assess patient's lethality and potential for self-injury  - Engage patient in 1:1 interactions, daily, for a minimum of 15 minutes  - Encourage patient to express feelings, fears, frustrations, hopes  - Establish rapport/trust with patient   Outcome: Progressing  Goal: Refrain from harming self  Description: Interventions:  - Monitor patient closely, per order  - Develop a trusting relationship  - Supervise medication ingestion, monitor effects and side effects   Outcome: Progressing  Goal: Attend and participate in unit activities, including therapeutic, recreational, and educational groups  Description: Interventions:  - Provide therapeutic and educational activities daily, encourage attendance and participation, and document same in the medical record  - Obtain collateral information, encourage visitation and family involvement in care   Outcome: Progressing     Problem: Depression  Goal: Verbalize thoughts and feelings  Description: Interventions:  - Assess and re-assess patient's level of risk   - Engage patient in 1:1 interactions, daily, for a minimum of 15 minutes   - Encourage patient to express feelings, fears, frustrations, hopes   Outcome: Progressing  Goal: Refrain from harming self  Description: Interventions:  - Monitor patient closely, per order   - Supervise medication ingestion, monitor effects and side effects   Outcome: Progressing  Goal: Refrain from isolation  Description: Interventions:  - Develop a trusting relationship   - Encourage socialization   Outcome: Progressing  Goal: Refrain from self-neglect  Outcome: Progressing     Problem: Risk for Violence/Aggression Toward Others  Goal: Verbalize thoughts and feelings  Description: Interventions:  - Assess and re-assess patient's level of risk, every waking shift  - Engage patient in 1:1 interactions, daily, for a minimum of 15 minutes   - Allow  patient to express feelings and frustrations in a safe and non-threatening manner   - Establish rapport/trust with patient   Outcome: Progressing  Goal: Refrain from harming others  Outcome: Progressing  Goal: Refrain from destructive acts on the environment or property  Outcome: Progressing  Goal: Control angry outbursts  Description: Interventions:  - Monitor patient closely, per order  - Ensure early verbal de-escalation  - Monitor prn medication needs  - Set reasonable/therapeutic limits, outline behavioral expectations, and consequences   - Provide a non-threatening milieu, utilizing the least restrictive interventions   Outcome: Progressing     Problem: DISCHARGE PLANNING - CARE MANAGEMENT  Goal: Discharge to post-acute care or home with appropriate resources  Description: INTERVENTIONS:  - Conduct assessment to determine patient/family and health care team treatment goals, and need for post-acute services based on payer coverage, community resources, and patient preferences, and barriers to discharge  - Address psychosocial, clinical, and financial barriers to discharge as identified in assessment in conjunction with the patient/family and health care team  - Arrange appropriate level of post-acute services according to patient’s   needs and preference and payer coverage in collaboration with the physician and health care team  - Communicate with and update the patient/family, physician, and health care team regarding progress on the discharge plan  - Arrange appropriate transportation to post-acute venues  Outcome: Progressing

## 2025-06-28 NOTE — ASSESSMENT & PLAN NOTE
70-year-old female with past psychiatric history of MDD and SHASTA who presents with worsening anxiety and depression.  She states that several stressors have occurred involving her daughter telling her that she would move out of the house to [place].  Juanita expresses that she has never lived on her own, and she is unable to live with her other 2 children.  This has led to patient requiring more anxiety medication, which patient believes has caused blood pressure fluctuations leading to her presenting to the ED fearful that she was having a heart attack.  She was started on Zoloft several weeks ago but was only prescribed 50 mg, she was also receiving gabapentin 100 mg 3 times daily, and hydroxyzine 25 mg 3 times daily.     Restart Zoloft and increase to 75 mg daily -plan to continue to optimize based on symptom improvement and patient tolerability  Restart gabapentin 100 mg 3 times daily -plan to continue to optimize based on symptom improvement and patient's tolerability  Restart hydroxyzine 25 mg at bedtime, patient reports this has helped with insomnia

## 2025-06-28 NOTE — PROGRESS NOTES
Progress Note - Behavioral Health   Name: Juanita Willard 70 y.o. female I MRN: [id number]  Unit/Bed#: [facility] 642-02 I Date of Admission: 6/26/2025   Date of Service: 6/28/2025 I Hospital Day: 2     Assessment & Plan  Severe episode of recurrent major depressive disorder, without psychotic features (HCC)  70-year-old female with past psychiatric history of MDD and SHASTA who presents with worsening anxiety and depression.  She states that several stressors have occurred involving her daughter telling her that she would move out of the house to [place].  Juanita expresses that she has never lived on her own, and she is unable to live with her other 2 children.  This has led to patient requiring more anxiety medication, which patient believes has caused blood pressure fluctuations leading to her presenting to the ED fearful that she was having a heart attack.  She was started on Zoloft several weeks ago but was only prescribed 50 mg, she was also receiving gabapentin 100 mg 3 times daily, and hydroxyzine 25 mg 3 times daily.     Restart Zoloft and increase to 75 mg daily -plan to continue to optimize based on symptom improvement and patient tolerability  Restart gabapentin 100 mg 3 times daily -plan to continue to optimize based on symptom improvement and patient's tolerability  Restart hydroxyzine 25 mg at bedtime, patient reports this has helped with insomnia  SHASTA (generalized anxiety disorder)  View plan for principal problem above  Mood insomnia (HCC)  Restart hydroxyzine 25 mg at bedtime for insomnia  Vitamin D deficiency  Supplementation provided   B12 deficiency  Supplementation provided  Folate deficiency  Supplementation provided    Current Medications:    Current Facility-Administered Medications:     aspirin (ECOTRIN LOW STRENGTH) EC tablet 81 mg, Oral, Daily    [START ON 6/29/2025] cyanocobalamin (VITAMIN B-12) tablet 1,000 mcg, Oral, Daily    cyanocobalamin injection 1,000 mcg, Intramuscular, Once     ergocalciferol (VITAMIN D2) capsule 50,000 Units, Oral, Weekly    folic acid (FOLVITE) tablet 1 mg, Oral, Daily    gabapentin (NEURONTIN) capsule 100 mg, Oral, TID    hydrOXYzine HCL (ATARAX) tablet 25 mg, Oral, HS    lisinopril (ZESTRIL) tablet 10 mg, Oral, Daily    melatonin tablet 3 mg, Oral, HS    metFORMIN (GLUCOPHAGE) tablet 500 mg, Oral, BID With Meals    metoprolol tartrate (LOPRESSOR) tablet 25 mg, Oral, Q12H HOWARD    pantoprazole (PROTONIX) EC tablet 40 mg, Oral, Early Morning    sertraline (ZOLOFT) tablet 75 mg, Oral, Daily      Risks/Benefits of Treatment:     Risks, benefits, and possible side effects of medications explained to patient and patient verbalizes understanding and agreement for treatment.    Progress Toward Goals: no significant progress    Treatment Planning:      - Encourage early mobility and having a structured day  - Provide frequent re-orientation, and cognitive stimulation  - Ensure assistive devices are in proper working order (eye-glasses, hearing aids)  - Encourage adequate hydration, nutrition and monitor bowel movements  - Maintain sleep-wake cycle: Uninterrupted sleep time; low-level lighting at night  - Fall precaution  - Follow up with [facility] regarding the medical problems   - Continue medication titration and treatment plan; adjust medication to optimize treatment response and as clinically indicated.   - Observation: Routine  - VS: as per unit protocol  - Encourage group attendance and milieu therapy  - Dispo: To be determined  - Estimated Discharge Day: 7/11/2025   - Legal Status : Voluntary 201 commitment.  - Long Stay Certification : Not Applicable    Subjective     Patient was visited on unit for continuing care; chart reviewed and discussed with multidisciplinary treatment team.  On approach, the patient was calm and cooperative. She continues to be fearful r/t to Metoprolol and Atarx 25 being administered together at hs that her BP will drop and she will die. She needed  reassurance against this. Her BP was recheced  at 2324 and then complied with Atarax 25 mg. Pt refused Gabapentin 100 mg and Melatonin 3 m Remains somatically preoccupied. Remains preoccupied with somatic sxs and medications. Denied any changes in appetite, and energy level. Reported interrupted sleep. Denied A/VH currently.  Remains internally preoccupied. Continues to report intrusive thoughts and negative ruminations. Denied SI/HI, intent or plan upon direct inquiry at this time.    Patient continues to be visible in the milieu and interacts with staff and peers. No reports of aggression or self-injurious behavior on unit.  The patient has remained in good behavioral control over past 24 hours.    Remains selective with medications and concerned about perceived side effects or perceived interactions.  Provided medication education and reassurance.    Sleep: slept off and on  Appetite: fair  Medication side effects: No  ROS: review of systems as noted above in HPI/Subjective report, no additional complaints, all other systems are negative    Objective :  Temp:  [97.6 °F (36.4 °C)-98.2 °F (36.8 °C)] 97.6 °F (36.4 °C)  HR:  [65-96] 73  BP: (136-186)/(70-81) 164/77  Resp:  [16-18] 16  SpO2:  [93 %-95 %] 95 %  O2 Device: None (Room air)    Mental Status Evaluation:    Appearance:  age appropriate   Behavior:  pleasant, cooperative   Speech:  normal rate, normal volume   Mood:  depressed, anxious   Affect:  flat   Thought Process:  goal directed   Thought Content:  ruminating thoughts   Perceptual Disturbances: none   Risk Potential: Suicidal Ideation -  None  Homicidal Ideation -  None  Potential for Aggression - No   Sensorium:  oriented to person, place, and time/date   Memory:  recent and remote memory grossly intact   Consciousness:  alert and awake   Attention/Concentration: attention span and concentration are age appropriate   Insight:  limited   Judgment: fair   Gait/Station: slow gait, uses walker   Motor  "Activity: no abnormal movements         Lab Results: I have reviewed the following results:  Results from the past 24 hours: No results found for this or any previous visit (from the past 24 hours).    Administrative Statements     Counseling / Coordination of Care:   Patient's progress discussed with staff in treatment team meeting.  Medication changes reviewed with staff in treatment team meeting.  Reassurance and supportive therapy provided.  Encouraged participation in milieu and group therapy on the unit.    Portions of the record may have been created with voice recognition software. Occasional wrong word or \"sound a like\" substitutions may have occurred due to the inherent limitations of voice recognition software. Read the chart carefully and recognize, using context, where substitutions have occurred.    JUNIOR Hickey 06/28/25  "

## 2025-06-28 NOTE — QUICK NOTE
A1c from yesterday was reviewed by myself today 6.2.  Diet changed to carb controlled 6 g.  Started on metformin 500 mg p.o. twice daily needs to follow-up with her PCP upon discharge from the [facility].

## 2025-06-28 NOTE — NURSING NOTE
Patient calm and cooperative. Present in milieu interacting with peers and staff. Some paranoia regarding taking the Vitamin D today said in the past it made her throw up at night, informed her it was the weekly dose and to see how she does with it. Medication and meal compliant. Denies SI/HI. Q 15 minute checks maintained.

## 2025-06-28 NOTE — NURSING NOTE
Juanita was visible in the dayroom socializing with select peers. She was pleasant and cooperative. She continues to be fearful r/t to Metoprolol and Atarx 25 being administered together at hs that her BP will drop and she will die. She needed reassurance against this. Her BP was recheced  at 2324 and then complied with Atarax 25 mg. Pt refused Gabapentin 100 mg and Melatonin 3 mg. Safety measures maintained.

## 2025-06-29 PROCEDURE — 99232 SBSQ HOSP IP/OBS MODERATE 35: CPT | Performed by: STUDENT IN AN ORGANIZED HEALTH CARE EDUCATION/TRAINING PROGRAM

## 2025-06-29 RX ADMIN — ASPIRIN 81 MG: 81 TABLET, COATED ORAL at 08:44

## 2025-06-29 RX ADMIN — HYDROXYZINE HYDROCHLORIDE 25 MG: 25 TABLET, FILM COATED ORAL at 22:09

## 2025-06-29 RX ADMIN — METFORMIN HYDROCHLORIDE 500 MG: 500 TABLET, FILM COATED ORAL at 17:41

## 2025-06-29 RX ADMIN — METFORMIN HYDROCHLORIDE 500 MG: 500 TABLET, FILM COATED ORAL at 08:44

## 2025-06-29 RX ADMIN — GABAPENTIN 100 MG: 100 CAPSULE ORAL at 22:08

## 2025-06-29 RX ADMIN — Medication 1000 MCG: at 08:44

## 2025-06-29 RX ADMIN — PANTOPRAZOLE SODIUM 40 MG: 40 TABLET, DELAYED RELEASE ORAL at 06:48

## 2025-06-29 RX ADMIN — METOPROLOL TARTRATE 25 MG: 25 TABLET, FILM COATED ORAL at 08:44

## 2025-06-29 RX ADMIN — GABAPENTIN 100 MG: 100 CAPSULE ORAL at 14:38

## 2025-06-29 RX ADMIN — METOPROLOL TARTRATE 25 MG: 25 TABLET, FILM COATED ORAL at 20:47

## 2025-06-29 RX ADMIN — SERTRALINE HYDROCHLORIDE 75 MG: 50 TABLET ORAL at 08:44

## 2025-06-29 RX ADMIN — LISINOPRIL 10 MG: 10 TABLET ORAL at 08:44

## 2025-06-29 RX ADMIN — GABAPENTIN 100 MG: 100 CAPSULE ORAL at 08:44

## 2025-06-29 RX ADMIN — FOLIC ACID 1 MG: 1 TABLET ORAL at 08:44

## 2025-06-29 NOTE — ASSESSMENT & PLAN NOTE
70-year-old female with past psychiatric history of MDD and SHASTA who presents with worsening anxiety and depression.  She states that several stressors have occurred involving her daughter telling her that she would move out of the house to [place].  Juanita expresses that she has never lived on her own, and she is unable to live with her other 2 children.  This has led to patient requiring more anxiety medication, which patient believes has caused blood pressure fluctuations leading to her presenting to the ED fearful that she was having a heart attack.  She was started on Zoloft several weeks ago but was only prescribed 50 mg, she was also receiving gabapentin 100 mg 3 times daily, and hydroxyzine 25 mg 3 times daily.     Restart Zoloft and increase to 75 mg daily -plan to continue to optimize based on symptom improvement and patient tolerability  Restart gabapentin 100 mg 3 times daily -plan to continue to optimize based on symptom improvement and patient's tolerability  Restart hydroxyzine 25 mg at bedtime, patient reports this has helped with insomnia

## 2025-06-29 NOTE — NURSING NOTE
[facility] ED NOTE    Patient : Miky Landers Age: 14 month old Sex: female   MRN: [id number] Encounter Date: 3/27/2023    History of Present Illness      Chief Complaint   Patient presents with   • Vomiting   • Loss Of Appetite     14mo old female without chronic medical problems presents to the ED with vomiting. Mother states pt has had a poor appetite yesterday and has been vomiting today. She's also had diarrhea and noted her abdomen has been bloated. Mother state the pt's stool looks grainy and yellow. Denies blood in the stool. No fever.           Review of Systems   All other systems reviewed and are negative.      Past Medical History     No Known Allergies    Past Medical History:   Diagnosis Date   • No pertinent past medical history        Past Surgical History:   Procedure Laterality Date   • NO PAST SURGERIES         No family history on file.         Physical Exam     ED Triage Vitals [03/27/23 1253]   ED Triage Vitals Group      Temp 98.7 °F (37.1 °C)      Heart Rate 119      Resp 32      /61      SpO2 100 %      EtCO2 mmHg       Height       Weight 21 lb 9 oz (9.78 kg)      Weight Scale Used Infant scale      BMI (Calculated)       IBW/kg (Calculated)        Pulse Ox is [100%] on Room Air which is [normal] for this patient    Physical Exam  Vitals and nursing note reviewed.   Constitutional:       General: She is active. She is not in acute distress.     Appearance: Normal appearance. She is well-developed.   HENT:      Head: Normocephalic and atraumatic.      Right Ear: Tympanic membrane and ear canal normal.      Left Ear: Tympanic membrane and ear canal normal.      Nose: Nose normal.      Mouth/Throat:      Mouth: Mucous membranes are moist.      Pharynx: Oropharynx is clear.      Neck: Normal range of motion and neck supple.   Eyes:      Extraocular Movements: Extraocular movements intact.      Conjunctiva/sclera: Conjunctivae normal.      Pupils: Pupils are equal, round, and reactive  Patient was visible and social in the milieu with select peers. Denies all psych s/s. No behaviors noted. No c/o pain. No needs expressed. Had 100% for breakfast. Had a visit with the dgt at 1000 that went well. Took her AM medications. Safety checks ongoing.   to light.   Cardiovascular:      Rate and Rhythm: Normal rate and regular rhythm.      Pulses: Normal pulses.      Heart sounds: Normal heart sounds. No murmur heard.  Pulmonary:      Effort: Pulmonary effort is normal. No respiratory distress.      Breath sounds: Normal breath sounds.   Abdominal:      General: There is no distension.      Palpations: Abdomen is soft.      Tenderness: There is no abdominal tenderness.   Musculoskeletal:         General: No swelling. Normal range of motion.   Skin:     General: Skin is warm and dry.      Capillary Refill: Capillary refill takes less than 2 seconds.      Coloration: Skin is not jaundiced.      Findings: No rash.   Neurological:      General: No focal deficit present.      Mental Status: She is alert.      Motor: No weakness.      Coordination: Coordination normal.         ED Course     Procedures    Results for orders placed or performed during the hospital encounter of 03/27/23   COVID/Flu/RSV panel   Result Value Ref Range    Rapid SARS-COV-2 by PCR Not Detected Not Detected / Detected / Presumptive Positive / Inhibitors present    Influenza A by PCR Not Detected Not Detected    Influenza B by PCR Not Detected Not Detected    RSV BY PCR Not Detected Not Detected    Isolation Guidelines      Procedural Comment     Streptococcus group A PCR    Specimen: Throat; Swab   Result Value Ref Range    STREPTOCOCCUS GROUP A PCR Not Detected Not Detected       Imaging Results          US INTUSSUSCEPTION (Final result)  Result time 03/27/23 14:14:57   Procedure changed from US ABDOMEN COMPLETE     Final result                 Impression:       No intussusception detected on the basis of available images..        Electronically Signed by: MARCELLO MARQUEZ MD   Signed on: 3/27/2023 2:14 PM   Workstation ID: [id number]             Narrative:    US INTUSSUSCEPTION    TECHNIQUE: US INTUSSUSCEPTION    INDICATION:  r/o intussusception abdominal pain    COMPARISON: None.    FINDINGS:       No sonographic evidence of intussusception detected.                                ED Medication Orders (From admission, onward)    Ordered Start     Status Ordering Provider    03/27/23 1328 03/27/23 1330  ondansetron ORAL (ZOFRAN) 4 MG/5ML oral solution 0.96 mg  ONCE         Last MAR action: Given POONAM ALVARADO               Medical Decision Making      Differential Diagnosis: viral syndrome, gastroenteritis, bowel obstruction, enteritis, colitis, constipation    Medical Decision Making  Patient is awake and oriented x 4, well-developed, well-nourished, nontoxic. No edema to extremities, in no acute respiratory distress, no rashes noted. Patient is hemodynamically stable.  Multiple differential diagnoses were considered.    This patient presents with abdominal discomfort of unclear etiology. An ultrasound was performed to evaluate for potential causes of the patient’s symptoms, however, neither the exam nor the ultrasound has identified any emergent etiology. Specifically, given the exam, labs, and unremarkable ultrasound I have very low suspicion for life-threatening disease.    I have discussed with the patient the level of uncertainty with undifferentiated abdominal discomfort and explained at length the need for close follow-up with primary care in one or two days as noted on the discharge instructions. I have discussed at length with the patient to return to the emergency department for further evaluation if the abdominal pain worsens, the patient develops fever, uncontrollable vomiting, fever, or any other new or concerning symptoms.            Limitations to history/exam/care: age of the patient  Co-morbidities impacting care: none/denies  Social factors impacting care: none known   External records reviewed: most recent outpatient PCP visit  Tests considered but not performed: N/A  Consults:     Clinical Impression     ED Diagnosis   1. Diarrhea, unspecified type        2. Vomiting, unspecified vomiting  type, unspecified whether nausea present            Disposition        Discharge 3/27/2023  3:03 PM  Miky Landers discharge to home/self care.      Employed shared decision making with the patient and all questions answered in my usual fashion. The patient and/or family was counseled on the preliminary diagnosis, treatment, prescription medications (especially for any sedating medications), and instructed on concerning signs and symptoms to return to the ED for further evaluation. Involved parties verbalized their understanding of the instructions given.    Discharge Medication List as of 3/27/2023  3:11 PM      START taking these medications    Details   ondansetron ORAL (ZOFRAN) 4 MG/5ML oral solution Take 1.3 mLs by mouth 3 times daily as needed for Nausea (vomiting).Normal, Disp-20 mL, R-0             Phoenix Kevin PA-C   3/28/2023 3:02 PM     · The 21st Century Cures Act makes medical notes like these available to patients in the interest of transparency. However, be advised this is a medical document. It is intended as peer to peer communication. It is written in medical language and may contain abbreviations, jargon, and other verbiage that could be misleading or confusing to lay persons. It may appear blunt or direct. Medical documents are intended to carry relevant information, facts as evident, and the clinical opinion of the medical provider.    · This note was made using voice dictation and may include inadvertent errors due to the dictation software. Please contact for clarification regarding any noted discrepancies.     Phoenix Kevin PA-C  03/28/23 0955

## 2025-06-29 NOTE — NURSING NOTE
Patient pleasant, calm and cooperative, able to make needs known. Is visible on the module, social with peers, attends groups. Denies SI/HI/AVH, anxiety or depression. Medication compliant. Support provided.

## 2025-06-29 NOTE — NURSING NOTE
"Patient refused morning Protonix stating \"It doesn't sit well with my stomach. My belly will be grumbling all morning.\" Patient also requested that Vitamin D be added to her allergy list reporting that it causes vomiting. She stated \"I took it yesterday but I forgot that I vomit every time I take it.\" Allergy added as requested.   "

## 2025-06-29 NOTE — NURSING NOTE
Patient is alert and oriented able to make her need known. She is calm and pleasant on approach. She is visible in the milieu interacts with staff and peers. She denies all pych s/s. She is medication and meal compliant. No behaviors noted. Will cont to monitor. Safety checks ongoing.

## 2025-06-29 NOTE — PROGRESS NOTES
Progress Note - Behavioral Health   Name: Juanita Willard 70 y.o. female I MRN: [id number]  Unit/Bed#: [facility] 642-02 I Date of Admission: 6/26/2025   Date of Service: 6/29/2025 I Hospital Day: 3     Assessment & Plan  Severe episode of recurrent major depressive disorder, without psychotic features (HCC)  70-year-old female with past psychiatric history of MDD and SHASTA who presents with worsening anxiety and depression.  She states that several stressors have occurred involving her daughter telling her that she would move out of the house to [place].  Juanita expresses that she has never lived on her own, and she is unable to live with her other 2 children.  This has led to patient requiring more anxiety medication, which patient believes has caused blood pressure fluctuations leading to her presenting to the ED fearful that she was having a heart attack.  She was started on Zoloft several weeks ago but was only prescribed 50 mg, she was also receiving gabapentin 100 mg 3 times daily, and hydroxyzine 25 mg 3 times daily.     Restart Zoloft and increase to 75 mg daily -plan to continue to optimize based on symptom improvement and patient tolerability  Restart gabapentin 100 mg 3 times daily -plan to continue to optimize based on symptom improvement and patient's tolerability  Restart hydroxyzine 25 mg at bedtime, patient reports this has helped with insomnia  SHASTA (generalized anxiety disorder)  View plan for principal problem above  Mood insomnia (HCC)  Restart hydroxyzine 25 mg at bedtime for insomnia  Vitamin D deficiency  Supplementation provided   B12 deficiency  Supplementation provided  Folate deficiency  Supplementation provided    Current Medications:    Current Facility-Administered Medications:     aspirin (ECOTRIN LOW STRENGTH) EC tablet 81 mg, Oral, Daily    cyanocobalamin (VITAMIN B-12) tablet 1,000 mcg, Oral, Daily    ergocalciferol (VITAMIN D2) capsule 50,000 Units, Oral, Weekly    folic acid (FOLVITE)  tablet 1 mg, Oral, Daily    gabapentin (NEURONTIN) capsule 100 mg, Oral, TID    hydrOXYzine HCL (ATARAX) tablet 25 mg, Oral, HS    lisinopril (ZESTRIL) tablet 10 mg, Oral, Daily    metFORMIN (GLUCOPHAGE) tablet 500 mg, Oral, BID With Meals    metoprolol tartrate (LOPRESSOR) tablet 25 mg, Oral, Q12H HOWARD    pantoprazole (PROTONIX) EC tablet 40 mg, Oral, Early Morning    sertraline (ZOLOFT) tablet 75 mg, Oral, Daily      Risks/Benefits of Treatment:     Risks, benefits, and possible side effects of medications explained to patient and patient verbalizes understanding and agreement for treatment.    Progress Toward Goals: progressing    Treatment Planning:      - Encourage early mobility and having a structured day  - Provide frequent re-orientation, and cognitive stimulation  - Ensure assistive devices are in proper working order (eye-glasses, hearing aids)  - Encourage adequate hydration, nutrition and monitor bowel movements  - Maintain sleep-wake cycle: Uninterrupted sleep time; low-level lighting at night  - Fall precaution  - Follow up with [facility] regarding the medical problems   - Continue medication titration and treatment plan; adjust medication to optimize treatment response and as clinically indicated.   - Observation: Routine  - VS: as per unit protocol  - Encourage group attendance and milieu therapy  - Dispo: To be determined  - Estimated Discharge Day: 7/11/2025   - Legal Status : Voluntary 201 commitment.  - Long Stay Certification : Not Applicable    Subjective     Patient was visited on unit for continuing care; chart reviewed and discussed with multidisciplinary treatment team.  Endorsed better mood but continues to have negative thinking and ruminating thoughts. Remains preoccupied with somatic sxs and medications.  Continues easily anxious especially when talking about blood pressure or other medical issues.  Denied any changes in appetite, and energy level. Reported interrupted sleep.  States she  slept well but woke up early and worrying.  Denied A/VH since yesterday. Continues to report intrusive thoughts and negative ruminations. Denied SI/HI, intent or plan upon direct inquiry at this time.  Compliant with all medications last night.  Tolerating Zoloft 25 mg without noted adverse effects.  Now stating she would like to go up to 100 mg, but would rather wait and speak to the doctor tomorrow.    Patient continues to be visible in the milieu and interacts with staff and peers. No reports of aggression or self-injurious behavior on unit.  The patient has remained in good behavioral control over past 24 hours.    Patient accepted all offered medications and no adverse effects of medications noted or reported.    Sleep: slept better  Appetite: fair  Medication side effects: No  ROS: review of systems as noted above in HPI/Subjective report, no additional complaints, all other systems are negative    Objective :  Temp:  [97.6 °F (36.4 °C)] 97.6 °F (36.4 °C)  HR:  [70-75] 75  BP: (133-153)/(70-77) 149/70  Resp:  [16-17] 17  SpO2:  [94 %-95 %] 95 %  O2 Device: None (Room air)    Mental Status Evaluation:    Appearance:  age appropriate   Behavior:  pleasant, cooperative   Speech:  hypertalkative   Mood:  anxious   Affect:  brighter   Thought Process:  perseverative   Thought Content:  somatic preoccupation   Perceptual Disturbances: none   Risk Potential: Suicidal Ideation -  None  Homicidal Ideation -  None  Potential for Aggression - No   Sensorium:  oriented to person, place, and time/date   Memory:  recent and remote memory grossly intact   Consciousness:  alert and awake   Attention/Concentration: attention span and concentration are age appropriate   Insight:  limited   Judgment: limited   Gait/Station: normal gait/station, normal balance   Motor Activity: abnormal movement noted: mild hand tremor present           Lab Results: I have reviewed the following results:  Results from the past 24 hours: No  "results found for this or any previous visit (from the past 24 hours).    Administrative Statements     Counseling / Coordination of Care:   Patient's progress discussed with staff in treatment team meeting.  Medication changes reviewed with staff in treatment team meeting.  Reassurance and supportive therapy provided.  Encouraged participation in milieu and group therapy on the unit.    Portions of the record may have been created with voice recognition software. Occasional wrong word or \"sound a like\" substitutions may have occurred due to the inherent limitations of voice recognition software. Read the chart carefully and recognize, using context, where substitutions have occurred.    JUNIOR Hickey 06/29/25  "

## 2025-06-30 PROCEDURE — 99232 SBSQ HOSP IP/OBS MODERATE 35: CPT | Performed by: PSYCHIATRY & NEUROLOGY

## 2025-06-30 RX ADMIN — METFORMIN HYDROCHLORIDE 500 MG: 500 TABLET, FILM COATED ORAL at 09:07

## 2025-06-30 RX ADMIN — METFORMIN HYDROCHLORIDE 500 MG: 500 TABLET, FILM COATED ORAL at 17:00

## 2025-06-30 RX ADMIN — SERTRALINE HYDROCHLORIDE 75 MG: 50 TABLET ORAL at 09:07

## 2025-06-30 RX ADMIN — METOPROLOL TARTRATE 25 MG: 25 TABLET, FILM COATED ORAL at 09:07

## 2025-06-30 RX ADMIN — GABAPENTIN 100 MG: 100 CAPSULE ORAL at 09:08

## 2025-06-30 RX ADMIN — GABAPENTIN 100 MG: 100 CAPSULE ORAL at 20:15

## 2025-06-30 RX ADMIN — LISINOPRIL 10 MG: 10 TABLET ORAL at 09:07

## 2025-06-30 RX ADMIN — ASPIRIN 81 MG: 81 TABLET, COATED ORAL at 09:08

## 2025-06-30 RX ADMIN — GABAPENTIN 100 MG: 100 CAPSULE ORAL at 13:58

## 2025-06-30 RX ADMIN — HYDROXYZINE HYDROCHLORIDE 25 MG: 25 TABLET, FILM COATED ORAL at 22:19

## 2025-06-30 RX ADMIN — METOPROLOL TARTRATE 25 MG: 25 TABLET, FILM COATED ORAL at 21:08

## 2025-06-30 RX ADMIN — FOLIC ACID 1 MG: 1 TABLET ORAL at 09:08

## 2025-06-30 RX ADMIN — Medication 1000 MCG: at 09:08

## 2025-06-30 NOTE — CASE MANAGEMENT
CM met with patient to discuss LAQUITA for Rafaela (daughter). Patient did not wish to sign LAQUITA for Rafaela at this time.

## 2025-06-30 NOTE — CASE MANAGEMENT
place call to Jihan (daughter) tel#[phone] to discuss visit. Jihan stated that she had visited patient on Sunday, though her sister Rafaela might want to visit patient.

## 2025-06-30 NOTE — NURSING NOTE
Pt alert and oriented x4, OOB ambulating using RW gait is steady. Visible and social on the unit. C/o back pain, but will only use ice pack, no PRN pain meds. Takes prescribed medications as ordered, but asks many questions regarding times and changes. Attended AM group. Eats well.

## 2025-06-30 NOTE — PROGRESS NOTES
Progress Note - Behavioral Health   Name: Juanita Willard 70 y.o. female I MRN: [id number]  Unit/Bed#: [facility] 642-02 I Date of Admission: 6/26/2025   Date of Service: 6/30/2025 I Hospital Day: 4    Assessment & Plan  Severe episode of recurrent major depressive disorder, without psychotic features (HCC)  70-year-old female with past psychiatric history of MDD and SHASTA who presents with worsening anxiety and depression.  She states that several stressors have occurred involving her daughter telling her that she would move out of the house to [place].  Juanita expresses that she has never lived on her own, and she is unable to live with her other 2 children.  This has led to patient requiring more anxiety medication, which patient believes has caused blood pressure fluctuations leading to her presenting to the ED fearful that she was having a heart attack.  She was started on Zoloft several weeks ago but was only prescribed 50 mg, she was also receiving gabapentin 100 mg 3 times daily, and hydroxyzine 25 mg 3 times daily.     Continue Zoloft and increase to 75 mg daily -plan to continue to optimize based on symptom improvement and patient tolerability  Continue gabapentin 100 mg 3 times daily -plan to continue to optimize based on symptom improvement and patient's tolerability  Continue hydroxyzine 25 mg at bedtime, patient reports this has helped with insomnia  SHASTA (generalized anxiety disorder)  View plan for principal problem above  Mood insomnia (HCC)  Restart hydroxyzine 25 mg at bedtime for insomnia  Vitamin D deficiency  Supplementation provided   B12 deficiency  Supplementation provided  Folate deficiency  Supplementation provided    Current Medications:    Current Facility-Administered Medications:     aspirin (ECOTRIN LOW STRENGTH) EC tablet 81 mg, Oral, Daily    cyanocobalamin (VITAMIN B-12) tablet 1,000 mcg, Oral, Daily    ergocalciferol (VITAMIN D2) capsule 50,000 Units, Oral, Weekly    folic acid (FOLVITE)  tablet 1 mg, Oral, Daily    gabapentin (NEURONTIN) capsule 100 mg, Oral, TID    hydrOXYzine HCL (ATARAX) tablet 25 mg, Oral, HS    lisinopril (ZESTRIL) tablet 10 mg, Oral, Daily    metFORMIN (GLUCOPHAGE) tablet 500 mg, Oral, BID With Meals    metoprolol tartrate (LOPRESSOR) tablet 25 mg, Oral, Q12H HOWARD    pantoprazole (PROTONIX) EC tablet 40 mg, Oral, Early Morning    sertraline (ZOLOFT) tablet 75 mg, Oral, Daily      Risks/Benefits of Treatment:     Risks, benefits, and possible side effects of medications explained to patient and patient verbalizes understanding and agreement for treatment.    Progress Toward Goals:     Treatment Planning:      - Encourage early mobility and having a structured day  - Provide frequent re-orientation, and cognitive stimulation  - Ensure assistive devices are in proper working order (eye-glasses, hearing aids)  - Encourage adequate hydration, nutrition and monitor bowel movements  - Maintain sleep-wake cycle: Uninterrupted sleep time; low-level lighting at night  - Fall precaution  - Follow up with [facility] regarding the medical problems   - Continue medication titration and treatment plan; adjust medication to optimize treatment response and as clinically indicated.   - Observation: Routine  - VS: as per unit protocol  - Encourage group attendance and milieu therapy  - Dispo: To be determined  - Estimated Discharge Day: 7/11/2025   - Legal Status : Voluntary 201 commitment.  -     Subjective     Patient was visited on unit for continuing care; chart reviewed and discussed with multidisciplinary treatment team.     Patient continues to be visible in the milieu and interacts with staff and peers. No reports of aggression or self-injurious behavior on unit. No PRN medications used in the past 24 hours.    Patient discussed during treatment team this morning and chart reviewed. Per nursing, patient is paranoid, history of abandonment, worried about her daughter moving to [place]. She  "refused scheduled melatonin over the weekend and refused scheduled Protonix this morning.    On my evaluation, patient was bright, reactive, smiling throughout interview. She is hypertalkative, tangential at times.She reports her mood is \"good.\" She states she enjoyed the weekend, playing word games and attending groups. She reports feeling \"great\" with the recent increase in Zoloft. She reports feeling more relaxed, and states she is able to laugh and enjoy things more easily. She denies any feelings of depression or anxiety today. She does report some trouble sleeping last night due to left back pain radiating down her leg. She states this kept her up to about 1:00 in the morning, however, she asked for an ice pack which alleviated her symptoms. She states she slept about 5 hours once she was able to fall asleep. She reports not taking melatonin over the weekend because she only takes \"half of a baby melatonin\" which she believes to be 0.5 mg at home, reporting that \"anything over that makes me feel hung over.\" She states she would prefer not to take anything for sleep at the moment and would be sleeping find other than the pain. She also denies any reflux symptoms and states the Protonix makes her stomach upset, so she decided to stop taking it. She denies any medication adverse effects. Denies suicidal ideation, homicidal ideation, auditory or visual hallucinations.     Sleep: difficulty falling asleep due to left sided back pain  Appetite: normal  Medication side effects: No  ROS: review of systems as noted above in HPI/Subjective report, all other systems are negative    Objective :  Temp:  [97.8 °F (36.6 °C)-98.6 °F (37 °C)] 97.8 °F (36.6 °C)  HR:  [76-79] 76  BP: (120-168)/(70-86) 165/77  Resp:  [18] 18  SpO2:  [92 %-96 %] 92 %  O2 Device: None (Room air)    Mental Status Evaluation:    Appearance:  age appropriate, casually dressed, fair hygiene, overtly appearing  female   Behavior:  pleasant, " "cooperative, calm, fair eye contact   Speech:  normal rate, normal volume, spontaneous, talkative   Mood:  \"Good\"   Affect:  normal range and intensity, smiling appropriately   Thought Process:  organized, goal directed, logical   Thought Content:  no overt delusions   Perceptual Disturbances: denies auditory or visual hallucinations when asked, does not appear responding to internal stimuli   Risk Potential: Suicidal Ideation -  None at present  Homicidal Ideation -  None at present  Potential for Aggression - Not at present   Sensorium:  oriented to person, place, and time/date   Memory:  recent and remote memory grossly intact   Consciousness:  alert and awake   Attention/Concentration: attention span and concentration are age appropriate   Insight:  fair   Judgment: fair   Gait/Station: normal gait/station   Motor Activity: no abnormal movements               Lab Results: I have reviewed the following results:  Results from the past 24 hours: No results found for this or any previous visit (from the past 24 hours).    Administrative Statements     Counseling / Coordination of Care:   Patient's progress discussed with staff in treatment team meeting.  Medication changes reviewed with staff in treatment team meeting.    Portions of the record may have been created with voice recognition software. Occasional wrong word or \"sound a like\" substitutions may have occurred due to the inherent limitations of voice recognition software. Read the chart carefully and recognize, using context, where substitutions have occurred.    Es Harris, DO 06/30/25  "

## 2025-06-30 NOTE — PROGRESS NOTES
Progress Note - Behavioral Health   Name: Juanita Willard 70 y.o. female I MRN: [id number]  Unit/Bed#: [facility] 642-02 I Date of Admission: 6/26/2025   Date of Service: 6/30/2025 I Hospital Day: 4  { ?Quick Links I Problem List I GALA I Billing Tip:89650}  Assessment & Plan  Severe episode of recurrent major depressive disorder, without psychotic features (HCC)  70-year-old female with past psychiatric history of MDD and SHASTA who presents with worsening anxiety and depression.  She states that several stressors have occurred involving her daughter telling her that she would move out of the house to [place].  Juanita expresses that she has never lived on her own, and she is unable to live with her other 2 children.  This has led to patient requiring more anxiety medication, which patient believes has caused blood pressure fluctuations leading to her presenting to the ED fearful that she was having a heart attack.  She was started on Zoloft several weeks ago but was only prescribed 50 mg, she was also receiving gabapentin 100 mg 3 times daily, and hydroxyzine 25 mg 3 times daily.     Restart Zoloft and increase to 75 mg daily -plan to continue to optimize based on symptom improvement and patient tolerability  Restart gabapentin 100 mg 3 times daily -plan to continue to optimize based on symptom improvement and patient's tolerability  Restart hydroxyzine 25 mg at bedtime, patient reports this has helped with insomnia  SHASTA (generalized anxiety disorder)  View plan for principal problem above  Mood insomnia (HCC)  Restart hydroxyzine 25 mg at bedtime for insomnia  Vitamin D deficiency  Supplementation provided   B12 deficiency  Supplementation provided  Folate deficiency  Supplementation provided    Current Medications:    Current Facility-Administered Medications:     aspirin (ECOTRIN LOW STRENGTH) EC tablet 81 mg, Oral, Daily    cyanocobalamin (VITAMIN B-12) tablet 1,000 mcg, Oral, Daily    ergocalciferol (VITAMIN D2)  "capsule 50,000 Units, Oral, Weekly    folic acid (FOLVITE) tablet 1 mg, Oral, Daily    gabapentin (NEURONTIN) capsule 100 mg, Oral, TID    hydrOXYzine HCL (ATARAX) tablet 25 mg, Oral, HS    lisinopril (ZESTRIL) tablet 10 mg, Oral, Daily    metFORMIN (GLUCOPHAGE) tablet 500 mg, Oral, BID With Meals    metoprolol tartrate (LOPRESSOR) tablet 25 mg, Oral, Q12H HOWARD    pantoprazole (PROTONIX) EC tablet 40 mg, Oral, Early Morning    sertraline (ZOLOFT) tablet 75 mg, Oral, Daily      Risks/Benefits of Treatment:     {Risks, Benefits, and Possible Side Effects of Medications:32367}    Progress Toward Goals: { Progress Towards Goals:20539}    Treatment Planning:      - Encourage early mobility and having a structured day  - Provide frequent re-orientation, and cognitive stimulation  - Ensure assistive devices are in proper working order (eye-glasses, hearing aids)  - Encourage adequate hydration, nutrition and monitor bowel movements  - Maintain sleep-wake cycle: Uninterrupted sleep time; low-level lighting at night  - Fall precaution  - Follow up with [facility] regarding the medical problems   - Continue medication titration and treatment plan; adjust medication to optimize treatment response and as clinically indicated.   - Observation: Routine  - VS: as per unit protocol  - Encourage group attendance and milieu therapy  - Dispo: To be determined  - Estimated Discharge Day: 7/11/2025   - Legal Status : Voluntary 201 commitment.  - Long Stay Certification : Not Applicable    Subjective   Per staff over the last 24 hours: Paranoid about medications, had a visit with daughter that went well, reports having abandonment issues.     {[facility] Progress Note:58405::\"Patient was visited on unit for continuing care; chart reviewed and discussed with multidisciplinary treatment team. \"}    [email or web address] Progress Note Continue:36965::\"Patient continues to be visible in the milieu and interacts with staff and peers.\"} {[email or web address] Progress " "Safety/PRNs:79821::\"No reports of aggression or self-injurious behavior on unit. No PRN medications used in the past 24 hours.\"}    {[facility] Med Compliance:60293::\"Patient accepted all offered medications and no adverse effects of medications noted or reported.\"}    Sleep: { Sleep:21574}  Appetite: { Appetite:90875}  Medication side effects: { Medication Side Effects:25730::\"No\"}  ROS: { ROS Progress:81394::\"review of systems as noted above in HPI/Subjective report\",\"all other systems are negative\"}    Objective :{?Quick Links I ICU Summary I Vitals I I/Os I LDAs I Mobility (PT/OT) I Code Status / ACP   ?Quick Links I Active Meds I Pain Meds I Antibiotics I Anticoagulants:23001}  Temp:  [97.8 °F (36.6 °C)-98.6 °F (37 °C)] 97.8 °F (36.6 °C)  HR:  [76-79] 76  BP: (120-168)/(70-86) 165/77  Resp:  [18] 18  SpO2:  [92 %-96 %] 92 %  O2 Device: None (Room air)    Mental Status Examination:  Appearance:  {EFO EXAM; GENERAL PSYCH:14415::\"age appropriate\",\"casually dressed\",\"looks stated age\"}   Behavior:  {EFO SL IP Exam; behavior:55142::\"guarded\",\"psychomotor retardation\",\"slow responses\"}   Speech:  {EFO SL IP findings; speech:82888::\"soft\"}   Mood:  {EFO EXAM; MOOD LESS/MORE:64437}   Affect:  {EFO AFFECT LESS/MORE:03132::\"constricted\",\"tearful\"}   Thought Process:  {EFO MISC; THOUGHT PROCESS:75496::\"logical\",\"coherent\",\"linear\",\"negative thinking\"}   Associations: {EFO THOUGHT ASSOCIATIONS:14965::\"intact associations\"}   Thought Content:  {EFO SL IP Exam; psych thought content:12159::\"no overt delusions\",\"negative thoughts\"}   Perceptual Disturbances: {EFO Perceptual Disturbances:88172::\"no auditory hallucinations\",\"no visual hallucinations\",\"does not appear responding to internal stimuli\"}   Risk Potential: Suicidal ideation - {EFO SI/HI with/without plan:06890}  Homicidal ideation - {EFO HI with/without plan:92808}  Potential for aggression - {EFO Potential for aggression:70381}   Sensorium:  {EFO " "ORIENTED/DISORIENTED:27758}   Memory:  {EFO EXAM; PSYCH COGNITION:96472::\"recent and remote memory grossly intact\"}   Consciousness:  {EFO Consciousness:53819::\"alert\",\"awake\"}   Attention/Concentration: {EFO EXAM; NEURO ATTENTION:24026::\"attention span and concentration are age appropriate\"}   Insight:  {EFO EXAM; PSYCH INSIGHT/JUDGEMENT:53753::\"limited\"}   Judgment: {EFO EXAM; PSYCH INSIGHT/JUDGEMENT:10639::\"limited\"}   Gait/Station: {EFO SL IP BH Gait/Station:35986}   Motor Activity: {EFO SL IP Psych Motor Activity:70316}         {?Quick Links I Lab Review I Micro Results I Radiology I Cardiology:91913}  Lab Results: I have reviewed the following results:  Most Recent Labs:   Lab Results   Component Value Date    WBC 5.79 06/27/2025    RBC 3.86 06/27/2025    HGB 12.0 06/27/2025    HCT 36.9 06/27/2025     06/27/2025    RDW 12.2 06/27/2025    NEUTROABS 2.63 06/27/2025    SODIUM 139 06/27/2025    K 3.7 06/27/2025     06/27/2025    CO2 28 06/27/2025    BUN 11 06/27/2025    CREATININE 0.62 06/27/2025    GLUC 106 06/27/2025    CALCIUM 8.9 06/27/2025    AST 13 06/27/2025    ALT 12 06/27/2025    ALKPHOS 37 06/27/2025    TP 5.4 (L) 06/27/2025    ALB 3.5 06/27/2025    TBILI 0.53 06/27/2025    CHOLESTEROL 176 06/27/2025    HDL 40 (L) 06/27/2025    TRIG 148 06/27/2025    LDLCALC 106 (H) 06/27/2025    NONHDLC 136 06/27/2025    CQS3TOGUIVAN 0.968 06/26/2025    TREPONEMAPA Non-reactive 06/27/2025    HGBA1C 6.2 (H) 06/27/2025     06/27/2025       Administrative Statements     Counseling / Coordination of Care:   Patient's progress discussed with staff in treatment team meeting.  Medication changes reviewed with staff in treatment team meeting.  Medications, treatment progress and treatment plan reviewed with patient.  Reassurance and supportive therapy provided.  Reoriented to reality and reassured.  Group attendance encouraged.  Encouraged participation in milieu and group therapy on the unit.    Portions of " "the record may have been created with voice recognition software. Occasional wrong word or \"sound a like\" substitutions may have occurred due to the inherent limitations of voice recognition software. Read the chart carefully and recognize, using context, where substitutions have occurred.    Rupal Mirza, DO 06/30/25  "

## 2025-06-30 NOTE — TREATMENT TEAM
Pt attends groups.  Pt pleasant and cooperative  Pt  displayed positive thinking pattens and steps to well being. Social with peers.  Pt anxious in afternoon.      06/30/25 1000   Activity/Group Checklist   Group Other (Comment)  (Community meeting: positive steps to well being)   Attendance Attended   Attendance Duration (min) 31-45   Interactions Interacted appropriately   Affect/Mood Appropriate   Goals Achieved Identified feelings;Identified triggers;Identified relapse prevention strategies;Discussed coping strategies;Discussed self-esteem issues;Able to listen to others;Able to reflect/comment on own behavior;Able to engage in interactions         06/30/25 1300 06/30/25 1400   Activity/Group Checklist   Group Other (Comment)  (Journaling) Admission/Discharge   Attendance Attended Did not attend   Attendance Duration (min) 46-60  --    Interactions Interacted appropriately  --    Affect/Mood Other (Comment)  (anxious)  --    Goals Achieved Identified triggers;Identified feelings;Discussed coping strategies;Discussed self-esteem issues;Able to listen to others;Able to engage in interactions  --

## 2025-06-30 NOTE — PROGRESS NOTES
06/30/25 0800   Team Meeting   Meeting Type Daily Rounds   Team Members Present   Team Members Present Physician;Nurse;;   Physician Team Member Dr. Mcgowan/Dr. Estrella/ Dr. Mirza/ TIERRA Bonilla   Nursing Team Member Barry/Candie   Care Management Team Member Kelsie   Social Work Team Member Venancio   Patient/Family Present   Patient Present No   Patient's Family Present No     Patient is paranoid regarding medications, social with peers. Patient reported to have visit with daughter over the weekend and it went well, patient refused Protonix and melatonin. Patient discharge is pending stabilization of mood and medications.

## 2025-06-30 NOTE — ASSESSMENT & PLAN NOTE
70-year-old female with past psychiatric history of MDD and SHASTA who presents with worsening anxiety and depression.  She states that several stressors have occurred involving her daughter telling her that she would move out of the house to [place].  Juanita expresses that she has never lived on her own, and she is unable to live with her other 2 children.  This has led to patient requiring more anxiety medication, which patient believes has caused blood pressure fluctuations leading to her presenting to the ED fearful that she was having a heart attack.  She was started on Zoloft several weeks ago but was only prescribed 50 mg, she was also receiving gabapentin 100 mg 3 times daily, and hydroxyzine 25 mg 3 times daily.     Continue Zoloft and increase to 75 mg daily -plan to continue to optimize based on symptom improvement and patient tolerability  Continue gabapentin 100 mg 3 times daily -plan to continue to optimize based on symptom improvement and patient's tolerability  Continue hydroxyzine 25 mg at bedtime, patient reports this has helped with insomnia

## 2025-06-30 NOTE — ASSESSMENT & PLAN NOTE
70-year-old female with past psychiatric history of MDD and SHASTA who presents with worsening anxiety and depression.  She states that several stressors have occurred involving her daughter telling her that she would move out of the house to [place].  Juanita expresses that she has never lived on her own, and she is unable to live with her other 2 children.  This has led to patient requiring more anxiety medication, which patient believes has caused blood pressure fluctuations leading to her presenting to the ED fearful that she was having a heart attack.  She was started on Zoloft several weeks ago but was only prescribed 50 mg, she was also receiving gabapentin 100 mg 3 times daily, and hydroxyzine 25 mg 3 times daily.     Restart Zoloft and increase to 75 mg daily -plan to continue to optimize based on symptom improvement and patient tolerability  Restart gabapentin 100 mg 3 times daily -plan to continue to optimize based on symptom improvement and patient's tolerability  Restart hydroxyzine 25 mg at bedtime, patient reports this has helped with insomnia

## 2025-07-01 PROCEDURE — 99232 SBSQ HOSP IP/OBS MODERATE 35: CPT | Performed by: PSYCHIATRY & NEUROLOGY

## 2025-07-01 RX ADMIN — SERTRALINE HYDROCHLORIDE 75 MG: 50 TABLET ORAL at 09:03

## 2025-07-01 RX ADMIN — METFORMIN HYDROCHLORIDE 500 MG: 500 TABLET, FILM COATED ORAL at 16:38

## 2025-07-01 RX ADMIN — GABAPENTIN 100 MG: 100 CAPSULE ORAL at 08:02

## 2025-07-01 RX ADMIN — METFORMIN HYDROCHLORIDE 500 MG: 500 TABLET, FILM COATED ORAL at 08:02

## 2025-07-01 RX ADMIN — ASPIRIN 81 MG: 81 TABLET, COATED ORAL at 09:04

## 2025-07-01 RX ADMIN — LISINOPRIL 10 MG: 10 TABLET ORAL at 09:03

## 2025-07-01 RX ADMIN — METOPROLOL TARTRATE 25 MG: 25 TABLET, FILM COATED ORAL at 09:03

## 2025-07-01 RX ADMIN — GABAPENTIN 100 MG: 100 CAPSULE ORAL at 20:51

## 2025-07-01 RX ADMIN — HYDROXYZINE HYDROCHLORIDE 25 MG: 25 TABLET, FILM COATED ORAL at 21:12

## 2025-07-01 RX ADMIN — GABAPENTIN 100 MG: 100 CAPSULE ORAL at 15:40

## 2025-07-01 RX ADMIN — METOPROLOL TARTRATE 25 MG: 25 TABLET, FILM COATED ORAL at 21:12

## 2025-07-01 RX ADMIN — Medication 1000 MCG: at 09:04

## 2025-07-01 RX ADMIN — FOLIC ACID 1 MG: 1 TABLET ORAL at 09:04

## 2025-07-01 NOTE — ASSESSMENT & PLAN NOTE
70-year-old female with past psychiatric history of MDD and SHASTA who presents with worsening anxiety and depression.  She states that several stressors have occurred involving her daughter telling her that she would move out of the house to [place].  Juanita expresses that she has never lived on her own, and she is unable to live with her other 2 children.  This has led to patient requiring more anxiety medication, which patient believes has caused blood pressure fluctuations leading to her presenting to the ED fearful that she was having a heart attack.  She was started on Zoloft several weeks ago but was only prescribed 50 mg, she was also receiving gabapentin 100 mg 3 times daily, and hydroxyzine 25 mg 3 times daily.     Continue Zoloft 75 mg daily -plan to continue to optimize based on symptom improvement and patient tolerability  Continue gabapentin 100 mg 3 times daily -plan to continue to optimize based on symptom improvement and patient's tolerability  Continue hydroxyzine 25 mg at bedtime, patient reports this has helped with insomnia

## 2025-07-01 NOTE — DISCHARGE INSTR - APPOINTMENTS
Behavioral Health Nurse Navigator, Corazon or Ruma will be calling you after your discharge, on the phone number that you provided.  They will be available as an additional support, if needed.   If you wish to speak with Corazon, you may contact her at [phone].

## 2025-07-01 NOTE — TREATMENT TEAM
07/01/25 0900   Provider Notification   Reason for Communication Procedure/test  (folate and vitamin levels from 6/26)   Provider Name Lydia Fair   Provider Role Hospitalist   Method of Communication Other (Comment)  (EPIC secure chat)   Response Waiting for response   Notification Time 1017

## 2025-07-01 NOTE — NURSING NOTE
"Patient is visible on the unit and social with peers. She denies all psych s/s but remains paranoid about medications causing her blood pressure to drop. She informed this writer that her BP was \"too low\" to take HS metoprolol. /69 and she was reassured that her BP was actually slightly elevated. Juanita took metoprolol with encouragement but then refused to take scheduled Atarax 1 hour later stating \"My blood pressure is low. I just know it.\" BP checked and resulted at 151/77 and she was agreeable to taking Atarax. Juanita reflected that her  had extensive medical problems and passed away at the age of 46 which causes her to be fearful of her own health conditions. She was offered and accepting of reassurance and support. Encouraged to inform staff of any needs or concerns.   "

## 2025-07-01 NOTE — NURSING NOTE
Patient is visible on the unit and social with peers. Patient denies all psych S/S. Patient is compliant with all medications during this shift. Patient is pleasant and cooperative with care. Patient appetite is good and she attended groups.

## 2025-07-01 NOTE — PLAN OF CARE
Problem: Ineffective Coping  Goal: Identifies ineffective coping skills  Outcome: Not Progressing     Problem: Anxiety  Goal: Anxiety is at manageable level  Description: Interventions:  - Assess and monitor patient's anxiety level.   - Monitor for signs and symptoms (heart palpitations, chest pain, shortness of breath, headaches, nausea, feeling jumpy, restlessness, irritable, apprehensive).   - Collaborate with interdisciplinary team and initiate plan and interventions as ordered.  - [place] patient to unit/surroundings  - Explain treatment plan  - Encourage participation in care  - Encourage verbalization of concerns/fears  - Identify coping mechanisms  - Assist in developing anxiety-reducing skills  - Administer/offer alternative therapies  - Limit or eliminate stimulants  Outcome: Progressing     Problem: Alteration in Thoughts and Perception  Goal: Verbalize thoughts and feelings  Description: Interventions:  - Promote a nonjudgmental and trusting relationship with the patient through active listening and therapeutic communication  - Assess patient's level of functioning, behavior and potential for risk  - Engage patient in 1 on 1 interactions  - Encourage patient to express fears, feelings, frustrations, and discuss symptoms    - [place] patient to reality, help patient recognize reality-based thinking   - Administer medications as ordered and assess for potential side effects  - Provide the patient education related to the signs and symptoms of the illness and desired effects of prescribed medications  Outcome: Progressing  Goal: Agree to be compliant with medication regime, as prescribed and report medication side effects  Description: Interventions:  - Offer appropriate PRN medication and supervise ingestion; conduct AIMS, as needed   Outcome: Progressing     Problem: Depression  Goal: Verbalize thoughts and feelings  Description: Interventions:  - Assess and re-assess patient's level of risk   - Engage  patient in 1:1 interactions, daily, for a minimum of 15 minutes   - Encourage patient to express feelings, fears, frustrations, hopes   Outcome: Progressing  Goal: Refrain from harming self  Description: Interventions:  - Monitor patient closely, per order   - Supervise medication ingestion, monitor effects and side effects   Outcome: Progressing  Goal: Refrain from isolation  Description: Interventions:  - Develop a trusting relationship   - Encourage socialization   Outcome: Progressing  Goal: Refrain from self-neglect  Outcome: Progressing

## 2025-07-01 NOTE — PROGRESS NOTES
07/01/25 0700   Team Meeting   Meeting Type Daily Rounds   Team Members Present   Team Members Present Physician;Nurse;;   Physician Team Member Dr. Sweeney / Dr. Estrella / Dr. Mirza / TIERRA Bonilla   Nursing Team Member Barry/Candie   Care Management Team Member Kelsie   Social Work Team Member Venancio   Patient/Family Present   Patient Present No   Patient's Family Present No     Patient reported to be paranoid with her medication, asking a lot of questions and giving excuses. Patient believes that medications are what killed her , patient denies everything. Patient discharge pending stabilization of mood.

## 2025-07-01 NOTE — TREATMENT TEAM
"Patient refused 2nd attempt to complete relapse prevention plan.,  Provided 2nd explanation for form and provide Crisis information.  Pt stated \"Do I have to?\".  Pt attends groups and social in milieu.     07/01/25 0930   Activity/Group Checklist   Group Admission/Discharge   Attendance Refused          "

## 2025-07-01 NOTE — PROGRESS NOTES
Progress Note - Behavioral Health   Name: Juanita Willard 70 y.o. female I MRN: [id number]  Unit/Bed#: [facility] 642-02 I Date of Admission: 6/26/2025   Date of Service: 7/1/2025 I Hospital Day: 5    Assessment & Plan  Severe episode of recurrent major depressive disorder, without psychotic features (HCC)  70-year-old female with past psychiatric history of MDD and SHASTA who presents with worsening anxiety and depression.  She states that several stressors have occurred involving her daughter telling her that she would move out of the house to [place].  Juanita expresses that she has never lived on her own, and she is unable to live with her other 2 children.  This has led to patient requiring more anxiety medication, which patient believes has caused blood pressure fluctuations leading to her presenting to the ED fearful that she was having a heart attack.  She was started on Zoloft several weeks ago but was only prescribed 50 mg, she was also receiving gabapentin 100 mg 3 times daily, and hydroxyzine 25 mg 3 times daily.     Continue Zoloft 75 mg daily -plan to continue to optimize based on symptom improvement and patient tolerability  Continue gabapentin 100 mg 3 times daily -plan to continue to optimize based on symptom improvement and patient's tolerability  Continue hydroxyzine 25 mg at bedtime, patient reports this has helped with insomnia  SHASTA (generalized anxiety disorder)  View plan for principal problem above  Mood insomnia (HCC)  Restart hydroxyzine 25 mg at bedtime for insomnia  Vitamin D deficiency  Supplementation provided   B12 deficiency  Supplementation provided  Folate deficiency  Supplementation provided    Current Medications:    Current Facility-Administered Medications:     aspirin (ECOTRIN LOW STRENGTH) EC tablet 81 mg, Oral, Daily    cyanocobalamin (VITAMIN B-12) tablet 1,000 mcg, Oral, Daily    ergocalciferol (VITAMIN D2) capsule 50,000 Units, Oral, Weekly    folic acid (FOLVITE) tablet 1 mg,  Oral, Daily    gabapentin (NEURONTIN) capsule 100 mg, Oral, TID    hydrOXYzine HCL (ATARAX) tablet 25 mg, Oral, HS    lisinopril (ZESTRIL) tablet 10 mg, Oral, Daily    metFORMIN (GLUCOPHAGE) tablet 500 mg, Oral, BID With Meals    metoprolol tartrate (LOPRESSOR) tablet 25 mg, Oral, Q12H HOWARD    pantoprazole (PROTONIX) EC tablet 40 mg, Oral, Early Morning    sertraline (ZOLOFT) tablet 75 mg, Oral, Daily      Risks/Benefits of Treatment:     Risks, benefits, and possible side effects of medications explained to patient and patient verbalizes understanding and agreement for treatment.    Progress Toward Goals: improving    Treatment Planning:      - Encourage early mobility and having a structured day  - Provide frequent re-orientation, and cognitive stimulation  - Ensure assistive devices are in proper working order (eye-glasses, hearing aids)  - Encourage adequate hydration, nutrition and monitor bowel movements  - Maintain sleep-wake cycle: Uninterrupted sleep time; low-level lighting at night  - Fall precaution  - Follow up with [facility] regarding the medical problems   - Continue medication titration and treatment plan; adjust medication to optimize treatment response and as clinically indicated.   - Observation: Routine  - VS: as per unit protocol  - Encourage group attendance and milieu therapy  - Dispo: To be determined  - Estimated Discharge Day: 7/2/2025   - Legal Status : Voluntary 201 commitment.  -     Subjective     Patient was visited on unit for continuing care; chart reviewed and discussed with multidisciplinary treatment team.     Patient continues to be visible in the milieu and interacts with staff and peers. No reports of aggression or self-injurious behavior on unit. No PRN medications used in the past 24 hours.    Patient accepted all offered medications and no adverse effects of medications noted or reported.    Patient discussed during treatment team this morning. Per nursing, patient is paranoid  "surrounding medications, thinking her blood pressure will drop. Accepted metoprolol with encouragement. Initially refused scheduled Atarax, but was eventually compliant.    On evaluation today, Juanita states her mood is \"good.\"  She reports sleeping well overnight, however she was woken up early this morning as her roommate needed a breathing treatment.  She states some increase in anxiety because \"everyone here is anxious.\"  She states that she has been keeping up her spirits and not letting others emotions get to her.  She reports attending groups and doing chair yoga and exercising.  She denies any feelings of depression today.  She denies suicidal ideation, homicidal ideation, auditory or visual hallucinations.  She is looking forward to going back to live with her daughter and is aware that there are no plans to move anytime in the near future.  She states she is taking all of her medications, describing last night's initial refusal as a miscommunication.  She states that she is being cautious and would like her blood pressure checked prior to taking Atarax, gabapentin, and metoprolol as she has had low blood pressure with these medications in the past.  After her blood pressure was taken last night, she was compliant with medications.    Sleep: early awakenings  Appetite: normal  Medication side effects: No  ROS: review of systems as noted above in HPI/Subjective report, all other systems are negative    Objective :  Temp:  [97.3 °F (36.3 °C)-97.7 °F (36.5 °C)] 97.3 °F (36.3 °C)  HR:  [75-82] 75  BP: (130-151)/(67-77) 151/67  Resp:  [16-19] 16  SpO2:  [92 %-95 %] 93 %  O2 Device: None (Room air)    Mental Status Evaluation:    Appearance:  Age appropriate, casually dressed, fair hygiene   Behavior:  Pleasant, cooperative, calm, fair eye contact   Speech:  Normal rate, normal volume, spontaneous, talkative   Mood:  \"good\"   Affect:  Bright and reactive   Thought Process:  Logical, organized, goal directed " "  Thought Content:  No overt delusions, some paranoia   Perceptual Disturbances: Denies auditory or visual hallucinations when asked, does not appear to be responding to internal stimuli   Risk Potential: Suicidal Ideation -not present  Homicidal Ideation -not at present  Potential for Aggression -not at present   Sensorium:  Oriented to person, place, time/date   Memory:  Recent and remote memory grossly intact   Consciousness:  Alert and awake   Attention/Concentration: Attention span is age-appropriate   Insight:  Fair   Judgment: Fair   Gait/Station: Normal gait/station   Motor Activity: No abnormal movements               Lab Results: I have reviewed the following results:      Administrative Statements     Counseling / Coordination of Care:   Patient's progress discussed with staff in treatment team meeting.  Medication changes reviewed with staff in treatment team meeting.    Portions of the record may have been created with voice recognition software. Occasional wrong word or \"sound a like\" substitutions may have occurred due to the inherent limitations of voice recognition software. Read the chart carefully and recognize, using context, where substitutions have occurred.    Es Harris,  07/01/25  "

## 2025-07-01 NOTE — CASE MANAGEMENT
CM place call to [phone] to schedule patient a follow up appointment. Patient scheduled on 7/25/25 at 1:30pm CM was then transferred to Eddie (manager) to inquire on the correct location of the appointment. CM left voice message.  Unclear if appointment was confirmed to be scheduled prior to CM being transferred.

## 2025-07-01 NOTE — TREATMENT TEAM
Pt attended groups.  Pt anxious.  Pt bright although expressed concerns.      07/01/25 1000   Activity/Group Checklist   Group Other (Comment)  (Commuity meeting:Preparing for discharge and crisis, 988)   Attendance Attended   Attendance Duration (min) 31-45   Interactions Interacted appropriately   Affect/Mood Incongruent;Other (Comment)  (anxious, bright)   Goals Achieved Identified triggers;Identified feelings;Identified relapse prevention strategies;Discussed coping strategies;Discussed discharge plans;Discussed self-esteem issues;Able to recieve feedback;Able to self-disclose;Verbalized increased hopefulness;Able to manage/cope with feelings;Able to reflect/comment on own behavior;Able to engage in interactions;Able to listen to others        present

## 2025-07-01 NOTE — CASE MANAGEMENT
CM place call to [facility] tel#[phone] to schedule aftercare appointments. CM was informed that that they are not long term and patient needs long term provider.    CM place call to [facility] tel#[phone] ([place]) who told CM that patient is not in their system.    CM spoke with Patient to discuss above mentioned. [facility], suggested CM call [facility].    CM place call to [facility] tel#[phone] to discuss d/c for tomorrow. CM left voice message requesting call back.    CM place call to [facility] tel#[phone] to inquire on patient appointment. [facility]     CM place call to [facility] in [place] tel#[phone].  CM left vm and requesting c/b.

## 2025-07-02 ENCOUNTER — TELEPHONE (OUTPATIENT)
Age: 70
End: 2025-07-02

## 2025-07-02 VITALS
RESPIRATION RATE: 16 BRPM | TEMPERATURE: 97.2 F | OXYGEN SATURATION: 94 % | HEART RATE: 73 BPM | SYSTOLIC BLOOD PRESSURE: 138 MMHG | DIASTOLIC BLOOD PRESSURE: 80 MMHG | BODY MASS INDEX: 41.38 KG/M2 | HEIGHT: 68 IN | WEIGHT: 273.06 LBS

## 2025-07-02 PROBLEM — E11.9 DIABETES (HCC): Status: ACTIVE | Noted: 2025-07-02

## 2025-07-02 PROBLEM — Z00.8 MEDICAL CLEARANCE FOR PSYCHIATRIC ADMISSION: Status: RESOLVED | Noted: 2025-06-27 | Resolved: 2025-07-02

## 2025-07-02 PROBLEM — R06.2 WHEEZING: Status: ACTIVE | Noted: 2025-07-02

## 2025-07-02 PROCEDURE — 99238 HOSP IP/OBS DSCHRG MGMT 30/<: CPT | Performed by: PSYCHIATRY & NEUROLOGY

## 2025-07-02 RX ORDER — GABAPENTIN 100 MG/1
100 CAPSULE ORAL 3 TIMES DAILY
Qty: 90 CAPSULE | Refills: 1 | Status: SHIPPED | OUTPATIENT
Start: 2025-07-02 | End: 2025-08-31

## 2025-07-02 RX ORDER — LISINOPRIL 10 MG/1
10 TABLET ORAL DAILY
Qty: 30 TABLET | Refills: 0 | Status: SHIPPED | OUTPATIENT
Start: 2025-07-02

## 2025-07-02 RX ORDER — HYDROXYZINE HYDROCHLORIDE 25 MG/1
25 TABLET, FILM COATED ORAL
Qty: 30 TABLET | Refills: 1 | Status: SHIPPED | OUTPATIENT
Start: 2025-07-02 | End: 2025-08-31

## 2025-07-02 RX ORDER — FOLIC ACID 1 MG/1
1 TABLET ORAL DAILY
Qty: 30 TABLET | Refills: 0 | Status: SHIPPED | OUTPATIENT
Start: 2025-07-03 | End: 2025-08-02

## 2025-07-02 RX ORDER — METOPROLOL TARTRATE 25 MG/1
25 TABLET, FILM COATED ORAL EVERY 12 HOURS SCHEDULED
Qty: 60 TABLET | Refills: 0 | Status: SHIPPED | OUTPATIENT
Start: 2025-07-02 | End: 2025-08-01

## 2025-07-02 RX ORDER — ALBUTEROL SULFATE 90 UG/1
2 INHALANT RESPIRATORY (INHALATION) EVERY 4 HOURS PRN
Qty: 6.7 G | Refills: 0 | Status: SHIPPED | OUTPATIENT
Start: 2025-07-02 | End: 2025-08-01

## 2025-07-02 RX ORDER — ERGOCALCIFEROL 1.25 MG/1
50000 CAPSULE, LIQUID FILLED ORAL WEEKLY
Qty: 7 CAPSULE | Refills: 0 | Status: SHIPPED | OUTPATIENT
Start: 2025-07-05 | End: 2025-08-17

## 2025-07-02 RX ORDER — ASPIRIN 81 MG/1
81 TABLET, CHEWABLE ORAL DAILY
Qty: 30 TABLET | Refills: 0 | Status: SHIPPED | OUTPATIENT
Start: 2025-07-02

## 2025-07-02 RX ADMIN — METOPROLOL TARTRATE 25 MG: 25 TABLET, FILM COATED ORAL at 09:36

## 2025-07-02 RX ADMIN — METFORMIN HYDROCHLORIDE 500 MG: 500 TABLET, FILM COATED ORAL at 09:36

## 2025-07-02 RX ADMIN — GABAPENTIN 100 MG: 100 CAPSULE ORAL at 09:36

## 2025-07-02 RX ADMIN — SERTRALINE HYDROCHLORIDE 75 MG: 50 TABLET ORAL at 09:36

## 2025-07-02 RX ADMIN — FOLIC ACID 1 MG: 1 TABLET ORAL at 09:36

## 2025-07-02 RX ADMIN — Medication 1000 MCG: at 09:36

## 2025-07-02 RX ADMIN — LISINOPRIL 10 MG: 10 TABLET ORAL at 09:36

## 2025-07-02 RX ADMIN — ASPIRIN 81 MG: 81 TABLET, COATED ORAL at 09:35

## 2025-07-02 NOTE — ASSESSMENT & PLAN NOTE
"Lab Results   Component Value Date    HGBA1C 6.2 (H) 06/27/2025       No results for input(s): \"POCGLU\" in the last 72 hours.    Blood Sugar Average: Last 72 hrs:      "

## 2025-07-02 NOTE — PROGRESS NOTES
07/02/25 0700   Team Meeting   Meeting Type Daily Rounds   Team Members Present   Team Members Present Physician;Nurse;;   Physician Team Member Dr. Sweeney / Dr. Estrella / Dr. Mirza / TIERRA Bonilla   Nursing Team Member Barry/Candie   Care Management Team Member Kelsie   Social Work Team Member Venancio   Patient/Family Present   Patient Present No   Patient's Family Present No     Patient denies all, visible, social, went to one group, discharge today 1pm with daughter picking her up.

## 2025-07-02 NOTE — PLAN OF CARE
Problem: Ineffective Coping  Goal: Identifies ineffective coping skills  Outcome: Adequate for Discharge  Goal: Identifies healthy coping skills  Outcome: Adequate for Discharge  Goal: Demonstrates healthy coping skills  Outcome: Adequate for Discharge  Goal: Participates in unit activities  Description: Interventions:  - Provide therapeutic environment   - Provide required programming   - Redirect inappropriate behaviors   Outcome: Adequate for Discharge  Goal: Patient/Family participate in treatment and DC plans  Description: Interventions:  - Provide therapeutic environment  Outcome: Adequate for Discharge  Goal: Patient/Family verbalizes awareness of resources  Outcome: Adequate for Discharge  Goal: Understands least restrictive measures  Description: Interventions:  - Utilize least restrictive behavior  Outcome: Adequate for Discharge  Goal: Free from restraint events  Description: - Utilize least restrictive measures   - Provide behavioral interventions   - Redirect inappropriate behaviors   Outcome: Adequate for Discharge     Problem: Anxiety  Goal: Anxiety is at manageable level  Description: Interventions:  - Assess and monitor patient's anxiety level.   - Monitor for signs and symptoms (heart palpitations, chest pain, shortness of breath, headaches, nausea, feeling jumpy, restlessness, irritable, apprehensive).   - Collaborate with interdisciplinary team and initiate plan and interventions as ordered.  - [place] patient to unit/surroundings  - Explain treatment plan  - Encourage participation in care  - Encourage verbalization of concerns/fears  - Identify coping mechanisms  - Assist in developing anxiety-reducing skills  - Administer/offer alternative therapies  - Limit or eliminate stimulants  Outcome: Adequate for Discharge     Problem: Alteration in Thoughts and Perception  Goal: Verbalize thoughts and feelings  Description: Interventions:  - Promote a nonjudgmental and trusting relationship with the  patient through active listening and therapeutic communication  - Assess patient's level of functioning, behavior and potential for risk  - Engage patient in 1 on 1 interactions  - Encourage patient to express fears, feelings, frustrations, and discuss symptoms    - [place] patient to reality, help patient recognize reality-based thinking   - Administer medications as ordered and assess for potential side effects  - Provide the patient education related to the signs and symptoms of the illness and desired effects of prescribed medications  Outcome: Adequate for Discharge  Goal: Refrain from acting on delusional thinking/internal stimuli  Description: Interventions:  - Monitor patient closely, per order   - Utilize least restrictive measures   - Set reasonable limits, give positive feedback for acceptable   - Administer medications as ordered and monitor of potential side effects  Outcome: Adequate for Discharge  Goal: Agree to be compliant with medication regime, as prescribed and report medication side effects  Description: Interventions:  - Offer appropriate PRN medication and supervise ingestion; conduct AIMS, as needed   Outcome: Adequate for Discharge  Goal: Attend and participate in unit activities, including therapeutic, recreational, and educational groups  Description: Interventions:  -Encourage Visitation and family involvement in care  Outcome: Adequate for Discharge     Problem: Risk for Self Injury/Neglect  Goal: Verbalize thoughts and feelings  Description: Interventions:  - Assess and re-assess patient's lethality and potential for self-injury  - Engage patient in 1:1 interactions, daily, for a minimum of 15 minutes  - Encourage patient to express feelings, fears, frustrations, hopes  - Establish rapport/trust with patient   Outcome: Adequate for Discharge  Goal: Refrain from harming self  Description: Interventions:  - Monitor patient closely, per order  - Develop a trusting relationship  - Supervise  medication ingestion, monitor effects and side effects   Outcome: Adequate for Discharge  Goal: Attend and participate in unit activities, including therapeutic, recreational, and educational groups  Description: Interventions:  - Provide therapeutic and educational activities daily, encourage attendance and participation, and document same in the medical record  - Obtain collateral information, encourage visitation and family involvement in care   Outcome: Adequate for Discharge     Problem: Depression  Goal: Verbalize thoughts and feelings  Description: Interventions:  - Assess and re-assess patient's level of risk   - Engage patient in 1:1 interactions, daily, for a minimum of 15 minutes   - Encourage patient to express feelings, fears, frustrations, hopes   Outcome: Adequate for Discharge  Goal: Refrain from harming self  Description: Interventions:  - Monitor patient closely, per order   - Supervise medication ingestion, monitor effects and side effects   Outcome: Adequate for Discharge  Goal: Refrain from isolation  Description: Interventions:  - Develop a trusting relationship   - Encourage socialization   Outcome: Adequate for Discharge  Goal: Refrain from self-neglect  Outcome: Adequate for Discharge     Problem: DISCHARGE PLANNING - CARE MANAGEMENT  Goal: Discharge to post-acute care or home with appropriate resources  Description: INTERVENTIONS:  - Conduct assessment to determine patient/family and health care team treatment goals, and need for post-acute services based on payer coverage, community resources, and patient preferences, and barriers to discharge  - Address psychosocial, clinical, and financial barriers to discharge as identified in assessment in conjunction with the patient/family and health care team  - Arrange appropriate level of post-acute services according to patient’s   needs and preference and payer coverage in collaboration with the physician and health care team  - Communicate with  and update the patient/family, physician, and health care team regarding progress on the discharge plan  - Arrange appropriate transportation to post-acute venues  Outcome: Adequate for Discharge

## 2025-07-02 NOTE — NURSING NOTE
Pt is alert and oriented , she is bright, cheerful, socializing in the day space. Pt is looking forward to going home today. She denies any symptoms of depression, anxiety, self harm ideas. Pt denies pain. Will continue to monitor.

## 2025-07-02 NOTE — PROGRESS NOTES
Pastoral Care Progress Note          Chaplaincy Interventions Utilized:   Empowerment: Encouraged focus on present, Encouraged self-care, Facilitated group experience, and Normalized experience of patient/family    Exploration: Explored hope, Explored emotional needs & resources, Explored relational needs & resources, and Explored spiritual needs & resources    Collaboration: Encouraged adherence to treatment plan     Relationship Building: Cultivated a relationship of care and support, Listened empathically, and Hospitality    The pt participated in spirituality group facilitated by the  today. Pt was active in discussion. She was able to name her daughter as a source that helps as a sounding board when making decisions and trying to be authentic to herself.

## 2025-07-02 NOTE — CASE MANAGEMENT
CM place call to [facility] tel#[phone] to discharge today at 1pm. [facility] stated that [facility] were gap services and supposed to be looking for long term provider though is unsure if they ever did. Jihan reported that patient is able to return home and she can pick her up at 1pm today.    CM spoke with patient to discuss outpatient psychiatry and gap services with [facility]. Patient reported that she is in agreement with [facility] psychiatry in [place] for discharge and will follow up with [facility] upon d/c.  Patient signed LAQUITA for [facility].    CM created ambulatory referral to [facility] in [place] for talk therapy and medication mangement.
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
## 2025-07-02 NOTE — ASSESSMENT & PLAN NOTE
70-year-old female with past psychiatric history of MDD and SHASTA who presents with worsening anxiety and depression.  She states that several stressors have occurred involving her daughter telling her that she would move out of the house to [place].  Juanita expresses that she has never lived on her own, and she is unable to live with her other 2 children.  This has led to patient requiring more anxiety medication, which patient believes has caused blood pressure fluctuations leading to her presenting to the ED fearful that she was having a heart attack.  She was started on Zoloft several weeks ago but was only prescribed 50 mg, she was also receiving gabapentin 100 mg 3 times daily, and hydroxyzine 25 mg 3 times daily.     Continue Zoloft 75 mg daily -plan to continue to optimize based on symptom improvement and patient tolerability  Continue gabapentin 100 mg 3 times daily -plan to continue to optimize based on symptom improvement and patient's tolerability  Continue hydroxyzine 25 mg at bedtime, patient reports this has helped with insomnia

## 2025-07-02 NOTE — BH TRANSITION RECORD
Contact Information: If you have any questions, concerns, pended studies, tests and/or procedures, or emergencies regarding your inpatient behavioral health visit. Please contact [facility] older adult behavioral health unit [facility] [phone] and ask to speak to a , nurse or physician. A contact is available 24 hours/ 7 days a week at this number.     Summary of Procedures Performed During your Stay:  Below is a list of major procedures performed during your hospital stay and a summary of results:  - Cardiac Procedures/Studies: EKG - 6/25/25 Normal sinus rhythm .    Pending Studies (From admission, onward)      None          Please follow up on the above pending studies with your PCP and/or referring provider.

## 2025-07-02 NOTE — PLAN OF CARE
Problem: DISCHARGE PLANNING - CARE MANAGEMENT  Goal: Discharge to post-acute care or home with appropriate resources  Description: INTERVENTIONS:  - Conduct assessment to determine patient/family and health care team treatment goals, and need for post-acute services based on payer coverage, community resources, and patient preferences, and barriers to discharge  - Address psychosocial, clinical, and financial barriers to discharge as identified in assessment in conjunction with the patient/family and health care team  - Arrange appropriate level of post-acute services according to patient’s   needs and preference and payer coverage in collaboration with the physician and health care team  - Communicate with and update the patient/family, physician, and health care team regarding progress on the discharge plan  - Arrange appropriate transportation to post-acute venues  Outcome: Adequate for Discharge  Pt to D/C today at 1pm. Pt denies SI/HI/AVH. Pt oriented x3. Pt to d/c to home and kerri will  upon discharge. Pt to follow up with [facility] on 07/21/2025.  Scripts sent to preferred pharmacy.     Discharge Address:  [address], [place], [place], [address]  Phone: [phone]

## 2025-07-02 NOTE — NURSING NOTE
Patient was visible and social in the milieu with select peers. Denies all psych s/s. No behaviors noted. No c/o pain. No needs expressed. Took her HS medications. Safety checks ongoing.

## 2025-07-02 NOTE — NURSING NOTE
Pt discharged to home -this RN reviewed all medications and follow up appointments. Pt has belongings accounted for. All questions answered.

## 2025-07-02 NOTE — PLAN OF CARE
Problem: Anxiety  Goal: Anxiety is at manageable level  Description: Interventions:  - Assess and monitor patient's anxiety level.   - Monitor for signs and symptoms (heart palpitations, chest pain, shortness of breath, headaches, nausea, feeling jumpy, restlessness, irritable, apprehensive).   - Collaborate with interdisciplinary team and initiate plan and interventions as ordered.  - [place] patient to unit/surroundings  - Explain treatment plan  - Encourage participation in care  - Encourage verbalization of concerns/fears  - Identify coping mechanisms  - Assist in developing anxiety-reducing skills  - Administer/offer alternative therapies  - Limit or eliminate stimulants  Outcome: Progressing     Problem: Risk for Self Injury/Neglect  Goal: Verbalize thoughts and feelings  Description: Interventions:  - Assess and re-assess patient's lethality and potential for self-injury  - Engage patient in 1:1 interactions, daily, for a minimum of 15 minutes  - Encourage patient to express feelings, fears, frustrations, hopes  - Establish rapport/trust with patient   Outcome: Progressing  Goal: Refrain from harming self  Description: Interventions:  - Monitor patient closely, per order  - Develop a trusting relationship  - Supervise medication ingestion, monitor effects and side effects   Outcome: Progressing  Goal: Attend and participate in unit activities, including therapeutic, recreational, and educational groups  Description: Interventions:  - Provide therapeutic and educational activities daily, encourage attendance and participation, and document same in the medical record  - Obtain collateral information, encourage visitation and family involvement in care   Outcome: Progressing     Problem: Depression  Goal: Verbalize thoughts and feelings  Description: Interventions:  - Assess and re-assess patient's level of risk   - Engage patient in 1:1 interactions, daily, for a minimum of 15 minutes   - Encourage patient to  express feelings, fears, frustrations, hopes   Outcome: Progressing  Goal: Refrain from harming self  Description: Interventions:  - Monitor patient closely, per order   - Supervise medication ingestion, monitor effects and side effects   Outcome: Progressing  Goal: Refrain from isolation  Description: Interventions:  - Develop a trusting relationship   - Encourage socialization   Outcome: Progressing  Goal: Refrain from self-neglect  Outcome: Progressing

## 2025-07-02 NOTE — TELEPHONE ENCOUNTER
Gloria Iglesias  I can put her in in [place] also 7/23 at 1200 with Gayla Hines          Previous Messages       ----- Message -----  From: Kadie Iglesias  Sent: 7/2/2025  10:06 AM EDT  To: Gloria Harrington  Subject: RE: [facility]                                          Thank you. Was patient added to the talk therapy wait list?  ----- Message -----  From: Gloria Harrington  Sent: 7/2/2025   9:58 AM EDT  To: Kadie Iglesias  Subject: [facility]                                              Good Morning,    Patient scheduled for the following in [place] :    7/21 at 10:00 with Darnell Wharton    [address] [place] [phone]

## 2025-07-08 ENCOUNTER — TELEPHONE (OUTPATIENT)
Dept: PSYCHIATRY | Facility: CLINIC | Age: 70
End: 2025-07-08

## 2025-07-08 NOTE — TELEPHONE ENCOUNTER
One week follow up call for New Patient appointment with Darnell Wharton on 7/21/25  was made on 7/8/25. Writer informed patient of New Patient paperwork needing to be completed 5 days prior to the appointment. Writer confirmed paperwork has been sent via ?.    Appointment was made on: 7/2/25    Unable to reach pt.

## 2025-07-21 ENCOUNTER — OFFICE VISIT (OUTPATIENT)
Dept: PSYCHIATRY | Facility: CLINIC | Age: 70
End: 2025-07-21
Payer: MEDICARE

## 2025-07-21 DIAGNOSIS — F41.1 GAD (GENERALIZED ANXIETY DISORDER): ICD-10-CM

## 2025-07-21 DIAGNOSIS — F33.2 SEVERE EPISODE OF RECURRENT MAJOR DEPRESSIVE DISORDER, WITHOUT PSYCHOTIC FEATURES (HCC): ICD-10-CM

## 2025-07-21 DIAGNOSIS — F39 MOOD INSOMNIA (HCC): ICD-10-CM

## 2025-07-21 DIAGNOSIS — F51.05 MOOD INSOMNIA (HCC): ICD-10-CM

## 2025-07-21 PROCEDURE — 90792 PSYCH DIAG EVAL W/MED SRVCS: CPT | Performed by: PHYSICIAN ASSISTANT

## 2025-07-21 RX ORDER — SERTRALINE HYDROCHLORIDE 100 MG/1
100 TABLET, FILM COATED ORAL DAILY
Qty: 30 TABLET | Refills: 1 | Status: SHIPPED | OUTPATIENT
Start: 2025-07-21 | End: 2025-09-19

## 2025-07-21 RX ORDER — GABAPENTIN 100 MG/1
100 CAPSULE ORAL 3 TIMES DAILY
Qty: 90 CAPSULE | Refills: 1 | Status: SHIPPED | OUTPATIENT
Start: 2025-07-21 | End: 2025-09-19

## 2025-07-21 RX ORDER — HYDROXYZINE HYDROCHLORIDE 25 MG/1
25 TABLET, FILM COATED ORAL
Qty: 30 TABLET | Refills: 1 | Status: SHIPPED | OUTPATIENT
Start: 2025-07-21 | End: 2025-09-19

## 2025-07-21 NOTE — ASSESSMENT & PLAN NOTE
Improving - continue sertraline 100 mg qd; to start talk therapy 7/23/25; f/u in 1 month  Orders:    Ambulatory Referral to Psych Services    gabapentin (NEURONTIN) 100 mg capsule; Take 1 capsule (100 mg total) by mouth 3 (three) times a day    hydrOXYzine HCL (ATARAX) 25 mg tablet; Take 1 tablet (25 mg total) by mouth daily at bedtime    sertraline (ZOLOFT) 100 mg tablet; Take 1 tablet (100 mg total) by mouth daily
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
## 2025-07-21 NOTE — ASSESSMENT & PLAN NOTE
Improving - continue sertraline 100 mg qd, gabapentin 100 mg TID, hydroxyzine 25 mg qhs; to start talk therapy 7/23/25; f/u in 1 month  Orders:    Ambulatory Referral to Psych Services    gabapentin (NEURONTIN) 100 mg capsule; Take 1 capsule (100 mg total) by mouth 3 (three) times a day    hydrOXYzine HCL (ATARAX) 25 mg tablet; Take 1 tablet (25 mg total) by mouth daily at bedtime    sertraline (ZOLOFT) 100 mg tablet; Take 1 tablet (100 mg total) by mouth daily

## 2025-07-21 NOTE — ASSESSMENT & PLAN NOTE
Improving - continue hydroxyzine 25 mg qhs; f/u in 1 month  Orders:    Ambulatory Referral to Psych Services    hydrOXYzine HCL (ATARAX) 25 mg tablet; Take 1 tablet (25 mg total) by mouth daily at bedtime

## 2025-07-23 ENCOUNTER — OFFICE VISIT (OUTPATIENT)
Dept: BEHAVIORAL/MENTAL HEALTH CLINIC | Facility: CLINIC | Age: 70
End: 2025-07-23
Payer: MEDICARE

## 2025-07-23 DIAGNOSIS — F33.2 SEVERE EPISODE OF RECURRENT MAJOR DEPRESSIVE DISORDER, WITHOUT PSYCHOTIC FEATURES (HCC): Primary | ICD-10-CM

## 2025-07-23 PROCEDURE — 90791 PSYCH DIAGNOSTIC EVALUATION: CPT | Performed by: SOCIAL WORKER
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
## 2025-07-27 DIAGNOSIS — E53.8 FOLATE DEFICIENCY: ICD-10-CM

## 2025-07-27 DIAGNOSIS — E53.8 B12 DEFICIENCY: ICD-10-CM

## 2025-07-27 DIAGNOSIS — I10 PRIMARY HYPERTENSION: ICD-10-CM

## 2025-07-27 DIAGNOSIS — E11.9 DIABETES (HCC): ICD-10-CM

## 2025-07-28 RX ORDER — LISINOPRIL 10 MG/1
10 TABLET ORAL DAILY
Qty: 90 TABLET | Refills: 1 | OUTPATIENT
Start: 2025-07-28

## 2025-07-28 RX ORDER — CYANOCOBALAMIN (VITAMIN B-12) 1000 MCG
1 TABLET ORAL DAILY
Qty: 90 TABLET | Refills: 1 | OUTPATIENT
Start: 2025-07-28

## 2025-07-28 RX ORDER — LORATADINE 10 MG
TABLET ORAL
Qty: 90 TABLET | Refills: 1 | OUTPATIENT
Start: 2025-07-28

## 2025-07-28 RX ORDER — FOLIC ACID 1 MG/1
1000 TABLET ORAL DAILY
Qty: 90 TABLET | Refills: 1 | OUTPATIENT
Start: 2025-07-28

## 2025-08-08 ENCOUNTER — TELEPHONE (OUTPATIENT)
Age: 70
End: 2025-08-08

## 2025-08-08 DIAGNOSIS — F33.2 SEVERE EPISODE OF RECURRENT MAJOR DEPRESSIVE DISORDER, WITHOUT PSYCHOTIC FEATURES (HCC): ICD-10-CM

## 2025-08-08 DIAGNOSIS — F41.1 GAD (GENERALIZED ANXIETY DISORDER): ICD-10-CM

## 2025-08-08 RX ORDER — SERTRALINE HYDROCHLORIDE 100 MG/1
100 TABLET, FILM COATED ORAL DAILY
Qty: 30 TABLET | Refills: 1 | Status: SHIPPED | OUTPATIENT
Start: 2025-08-08 | End: 2025-10-07

## 2025-08-15 ENCOUNTER — TELEMEDICINE (OUTPATIENT)
Dept: BEHAVIORAL/MENTAL HEALTH CLINIC | Facility: CLINIC | Age: 70
End: 2025-08-15
Payer: MEDICARE

## 2025-08-15 DIAGNOSIS — F33.2 SEVERE EPISODE OF RECURRENT MAJOR DEPRESSIVE DISORDER, WITHOUT PSYCHOTIC FEATURES (HCC): Primary | ICD-10-CM

## 2025-08-15 PROCEDURE — 90837 PSYTX W PT 60 MINUTES: CPT | Performed by: SOCIAL WORKER
